# Patient Record
Sex: FEMALE | Race: WHITE | Employment: UNEMPLOYED | URBAN - METROPOLITAN AREA
[De-identification: names, ages, dates, MRNs, and addresses within clinical notes are randomized per-mention and may not be internally consistent; named-entity substitution may affect disease eponyms.]

---

## 2023-04-06 ENCOUNTER — APPOINTMENT (OUTPATIENT)
Dept: CT IMAGING | Facility: HOSPITAL | Age: 83
End: 2023-04-06
Attending: EMERGENCY MEDICINE
Payer: MEDICARE

## 2023-04-06 ENCOUNTER — APPOINTMENT (OUTPATIENT)
Dept: GENERAL RADIOLOGY | Facility: HOSPITAL | Age: 83
End: 2023-04-06
Attending: EMERGENCY MEDICINE
Payer: MEDICARE

## 2023-04-06 ENCOUNTER — HOSPITAL ENCOUNTER (INPATIENT)
Facility: HOSPITAL | Age: 83
LOS: 3 days | Discharge: NURSING, REHAB OR LONG-TERM CARE | End: 2023-04-10
Attending: EMERGENCY MEDICINE | Admitting: INTERNAL MEDICINE
Payer: MEDICARE

## 2023-04-06 DIAGNOSIS — S72.002A CLOSED FRACTURE OF NECK OF LEFT FEMUR, INITIAL ENCOUNTER (HCC): Primary | ICD-10-CM

## 2023-04-06 LAB
ALBUMIN SERPL-MCNC: 3.4 G/DL (ref 3.4–5)
ALBUMIN/GLOB SERPL: 1 {RATIO} (ref 1–2)
ALP LIVER SERPL-CCNC: 90 U/L
ALT SERPL-CCNC: 23 U/L
ANION GAP SERPL CALC-SCNC: 6 MMOL/L (ref 0–18)
AST SERPL-CCNC: 23 U/L (ref 15–37)
BASOPHILS # BLD AUTO: 0.05 X10(3) UL (ref 0–0.2)
BASOPHILS NFR BLD AUTO: 0.4 %
BILIRUB SERPL-MCNC: 0.6 MG/DL (ref 0.1–2)
BUN BLD-MCNC: 23 MG/DL (ref 7–18)
CALCIUM BLD-MCNC: 9.1 MG/DL (ref 8.5–10.1)
CHLORIDE SERPL-SCNC: 107 MMOL/L (ref 98–112)
CO2 SERPL-SCNC: 27 MMOL/L (ref 21–32)
CREAT BLD-MCNC: 0.97 MG/DL
EOSINOPHIL # BLD AUTO: 0.1 X10(3) UL (ref 0–0.7)
EOSINOPHIL NFR BLD AUTO: 0.8 %
ERYTHROCYTE [DISTWIDTH] IN BLOOD BY AUTOMATED COUNT: 13.2 %
GFR SERPLBLD BASED ON 1.73 SQ M-ARVRAT: 59 ML/MIN/1.73M2 (ref 60–?)
GLOBULIN PLAS-MCNC: 3.5 G/DL (ref 2.8–4.4)
GLUCOSE BLD-MCNC: 147 MG/DL (ref 70–99)
HCT VFR BLD AUTO: 38.3 %
HGB BLD-MCNC: 13.1 G/DL
IMM GRANULOCYTES # BLD AUTO: 0.06 X10(3) UL (ref 0–1)
IMM GRANULOCYTES NFR BLD: 0.5 %
LYMPHOCYTES # BLD AUTO: 1.21 X10(3) UL (ref 1–4)
LYMPHOCYTES NFR BLD AUTO: 9.7 %
MCH RBC QN AUTO: 30.5 PG (ref 26–34)
MCHC RBC AUTO-ENTMCNC: 34.2 G/DL (ref 31–37)
MCV RBC AUTO: 89.1 FL
MONOCYTES # BLD AUTO: 0.93 X10(3) UL (ref 0.1–1)
MONOCYTES NFR BLD AUTO: 7.4 %
NEUTROPHILS # BLD AUTO: 10.15 X10 (3) UL (ref 1.5–7.7)
NEUTROPHILS # BLD AUTO: 10.15 X10(3) UL (ref 1.5–7.7)
NEUTROPHILS NFR BLD AUTO: 81.2 %
OSMOLALITY SERPL CALC.SUM OF ELEC: 296 MOSM/KG (ref 275–295)
PLATELET # BLD AUTO: 177 10(3)UL (ref 150–450)
POTASSIUM SERPL-SCNC: 3.9 MMOL/L (ref 3.5–5.1)
PROT SERPL-MCNC: 6.9 G/DL (ref 6.4–8.2)
RBC # BLD AUTO: 4.3 X10(6)UL
SARS-COV-2 RNA RESP QL NAA+PROBE: NOT DETECTED
SODIUM SERPL-SCNC: 140 MMOL/L (ref 136–145)
WBC # BLD AUTO: 12.5 X10(3) UL (ref 4–11)

## 2023-04-06 PROCEDURE — 73560 X-RAY EXAM OF KNEE 1 OR 2: CPT | Performed by: EMERGENCY MEDICINE

## 2023-04-06 PROCEDURE — 73502 X-RAY EXAM HIP UNI 2-3 VIEWS: CPT | Performed by: EMERGENCY MEDICINE

## 2023-04-06 PROCEDURE — 99223 1ST HOSP IP/OBS HIGH 75: CPT | Performed by: INTERNAL MEDICINE

## 2023-04-06 PROCEDURE — 73700 CT LOWER EXTREMITY W/O DYE: CPT | Performed by: EMERGENCY MEDICINE

## 2023-04-06 PROCEDURE — 76377 3D RENDER W/INTRP POSTPROCES: CPT | Performed by: EMERGENCY MEDICINE

## 2023-04-06 RX ORDER — ACETAMINOPHEN 500 MG
1000 TABLET ORAL ONCE
Status: COMPLETED | OUTPATIENT
Start: 2023-04-06 | End: 2023-04-06

## 2023-04-06 NOTE — ED INITIAL ASSESSMENT (HPI)
A&Ox3 patient bib family for c/o L sided hip and leg pain s/p fall today    Patient was trying to sit on the couch and missed the seat, falling and landing on L side    Denies hitting head/LOC, no AC therapy     RR even/NL, CMS intact

## 2023-04-07 ENCOUNTER — ANESTHESIA EVENT (OUTPATIENT)
Dept: SURGERY | Facility: HOSPITAL | Age: 83
End: 2023-04-07
Payer: MEDICARE

## 2023-04-07 ENCOUNTER — APPOINTMENT (OUTPATIENT)
Dept: GENERAL RADIOLOGY | Facility: HOSPITAL | Age: 83
End: 2023-04-07
Attending: ORTHOPAEDIC SURGERY
Payer: MEDICARE

## 2023-04-07 ENCOUNTER — ANESTHESIA (OUTPATIENT)
Dept: SURGERY | Facility: HOSPITAL | Age: 83
End: 2023-04-07
Payer: MEDICARE

## 2023-04-07 LAB
ANION GAP SERPL CALC-SCNC: 6 MMOL/L (ref 0–18)
ATRIAL RATE: 80 BPM
BASOPHILS # BLD AUTO: 0.04 X10(3) UL (ref 0–0.2)
BASOPHILS NFR BLD AUTO: 0.5 %
BILIRUB UR QL STRIP.AUTO: NEGATIVE
BUN BLD-MCNC: 20 MG/DL (ref 7–18)
CALCIUM BLD-MCNC: 9 MG/DL (ref 8.5–10.1)
CHLORIDE SERPL-SCNC: 109 MMOL/L (ref 98–112)
CLARITY UR REFRACT.AUTO: CLEAR
CO2 SERPL-SCNC: 26 MMOL/L (ref 21–32)
COLOR UR AUTO: YELLOW
CREAT BLD-MCNC: 0.9 MG/DL
EOSINOPHIL # BLD AUTO: 0.32 X10(3) UL (ref 0–0.7)
EOSINOPHIL NFR BLD AUTO: 4.2 %
ERYTHROCYTE [DISTWIDTH] IN BLOOD BY AUTOMATED COUNT: 13.3 %
EST. AVERAGE GLUCOSE BLD GHB EST-MCNC: 157 MG/DL (ref 68–126)
GFR SERPLBLD BASED ON 1.73 SQ M-ARVRAT: 64 ML/MIN/1.73M2 (ref 60–?)
GLUCOSE BLD-MCNC: 109 MG/DL (ref 70–99)
GLUCOSE BLD-MCNC: 115 MG/DL (ref 70–99)
GLUCOSE BLD-MCNC: 122 MG/DL (ref 70–99)
GLUCOSE BLD-MCNC: 123 MG/DL (ref 70–99)
GLUCOSE BLD-MCNC: 125 MG/DL (ref 70–99)
GLUCOSE UR STRIP.AUTO-MCNC: NEGATIVE MG/DL
HBA1C MFR BLD: 7.1 % (ref ?–5.7)
HCT VFR BLD AUTO: 39.7 %
HGB BLD-MCNC: 13 G/DL
IMM GRANULOCYTES # BLD AUTO: 0.02 X10(3) UL (ref 0–1)
IMM GRANULOCYTES NFR BLD: 0.3 %
INR BLD: 1.14 (ref 0.85–1.16)
KETONES UR STRIP.AUTO-MCNC: NEGATIVE MG/DL
LEUKOCYTE ESTERASE UR QL STRIP.AUTO: NEGATIVE
LYMPHOCYTES # BLD AUTO: 1.37 X10(3) UL (ref 1–4)
LYMPHOCYTES NFR BLD AUTO: 17.8 %
MCH RBC QN AUTO: 30 PG (ref 26–34)
MCHC RBC AUTO-ENTMCNC: 32.7 G/DL (ref 31–37)
MCV RBC AUTO: 91.5 FL
MONOCYTES # BLD AUTO: 0.68 X10(3) UL (ref 0.1–1)
MONOCYTES NFR BLD AUTO: 8.8 %
MRSA NASAL: NEGATIVE
NEUTROPHILS # BLD AUTO: 5.26 X10 (3) UL (ref 1.5–7.7)
NEUTROPHILS # BLD AUTO: 5.26 X10(3) UL (ref 1.5–7.7)
NEUTROPHILS NFR BLD AUTO: 68.4 %
NITRITE UR QL STRIP.AUTO: NEGATIVE
OSMOLALITY SERPL CALC.SUM OF ELEC: 296 MOSM/KG (ref 275–295)
P AXIS: 58 DEGREES
P-R INTERVAL: 164 MS
PH UR STRIP.AUTO: 5 [PH] (ref 5–8)
PLATELET # BLD AUTO: 181 10(3)UL (ref 150–450)
POTASSIUM SERPL-SCNC: 3.7 MMOL/L (ref 3.5–5.1)
PROT UR STRIP.AUTO-MCNC: NEGATIVE MG/DL
PROTHROMBIN TIME: 14.6 SECONDS (ref 11.6–14.8)
Q-T INTERVAL: 410 MS
QRS DURATION: 82 MS
QTC CALCULATION (BEZET): 472 MS
R AXIS: -8 DEGREES
RBC # BLD AUTO: 4.34 X10(6)UL
RBC UR QL AUTO: NEGATIVE
SODIUM SERPL-SCNC: 141 MMOL/L (ref 136–145)
SP GR UR STRIP.AUTO: 1.02 (ref 1–1.03)
STAPH A BY PCR: NEGATIVE
T AXIS: 111 DEGREES
UROBILINOGEN UR STRIP.AUTO-MCNC: <2 MG/DL
VENTRICULAR RATE: 80 BPM
WBC # BLD AUTO: 7.7 X10(3) UL (ref 4–11)

## 2023-04-07 PROCEDURE — 0QS734Z REPOSITION LEFT UPPER FEMUR WITH INTERNAL FIXATION DEVICE, PERCUTANEOUS APPROACH: ICD-10-PCS | Performed by: ORTHOPAEDIC SURGERY

## 2023-04-07 PROCEDURE — 76000 FLUOROSCOPY <1 HR PHYS/QHP: CPT | Performed by: ORTHOPAEDIC SURGERY

## 2023-04-07 PROCEDURE — 99232 SBSQ HOSP IP/OBS MODERATE 35: CPT | Performed by: HOSPITALIST

## 2023-04-07 PROCEDURE — BQ111ZZ FLUOROSCOPY OF LEFT HIP USING LOW OSMOLAR CONTRAST: ICD-10-PCS | Performed by: ORTHOPAEDIC SURGERY

## 2023-04-07 DEVICE — SCREW CANN PT 6.5 16X80: Type: IMPLANTABLE DEVICE | Site: HIP | Status: FUNCTIONAL

## 2023-04-07 DEVICE — SCREW CANN PT 7.3X80 209.880: Type: IMPLANTABLE DEVICE | Site: HIP | Status: FUNCTIONAL

## 2023-04-07 RX ORDER — SODIUM CHLORIDE 9 MG/ML
INJECTION, SOLUTION INTRAVENOUS CONTINUOUS
Status: DISCONTINUED | OUTPATIENT
Start: 2023-04-07 | End: 2023-04-08

## 2023-04-07 RX ORDER — VANCOMYCIN HYDROCHLORIDE
15 ONCE
Status: COMPLETED | OUTPATIENT
Start: 2023-04-07 | End: 2023-04-08

## 2023-04-07 RX ORDER — DIPHENHYDRAMINE HCL 25 MG
25 CAPSULE ORAL EVERY 4 HOURS PRN
Status: DISCONTINUED | OUTPATIENT
Start: 2023-04-07 | End: 2023-04-10

## 2023-04-07 RX ORDER — ESCITALOPRAM OXALATE 10 MG/1
10 TABLET ORAL DAILY
Status: DISCONTINUED | OUTPATIENT
Start: 2023-04-07 | End: 2023-04-10

## 2023-04-07 RX ORDER — SODIUM PHOSPHATE, DIBASIC AND SODIUM PHOSPHATE, MONOBASIC 7; 19 G/133ML; G/133ML
1 ENEMA RECTAL ONCE AS NEEDED
Status: DISCONTINUED | OUTPATIENT
Start: 2023-04-07 | End: 2023-04-10

## 2023-04-07 RX ORDER — METOCLOPRAMIDE HYDROCHLORIDE 5 MG/ML
10 INJECTION INTRAMUSCULAR; INTRAVENOUS EVERY 8 HOURS PRN
Status: DISCONTINUED | OUTPATIENT
Start: 2023-04-07 | End: 2023-04-07 | Stop reason: HOSPADM

## 2023-04-07 RX ORDER — LOPERAMIDE HYDROCHLORIDE 2 MG/1
2 CAPSULE ORAL
COMMUNITY

## 2023-04-07 RX ORDER — MORPHINE SULFATE 4 MG/ML
4 INJECTION, SOLUTION INTRAMUSCULAR; INTRAVENOUS EVERY 2 HOUR PRN
Status: DISCONTINUED | OUTPATIENT
Start: 2023-04-07 | End: 2023-04-10

## 2023-04-07 RX ORDER — ACETAMINOPHEN 10 MG/ML
1000 INJECTION, SOLUTION INTRAVENOUS ONCE AS NEEDED
Status: CANCELLED | OUTPATIENT
Start: 2023-04-07 | End: 2023-04-07

## 2023-04-07 RX ORDER — AMLODIPINE BESYLATE 2.5 MG/1
2.5 TABLET ORAL DAILY
Status: DISCONTINUED | OUTPATIENT
Start: 2023-04-07 | End: 2023-04-08

## 2023-04-07 RX ORDER — PANTOPRAZOLE SODIUM 20 MG/1
20 TABLET, DELAYED RELEASE ORAL DAILY
Status: DISCONTINUED | OUTPATIENT
Start: 2023-04-07 | End: 2023-04-10

## 2023-04-07 RX ORDER — LABETALOL HYDROCHLORIDE 5 MG/ML
5 INJECTION, SOLUTION INTRAVENOUS EVERY 5 MIN PRN
Status: DISCONTINUED | OUTPATIENT
Start: 2023-04-07 | End: 2023-04-07 | Stop reason: HOSPADM

## 2023-04-07 RX ORDER — CEFAZOLIN SODIUM/WATER 2 G/20 ML
SYRINGE (ML) INTRAVENOUS AS NEEDED
Status: DISCONTINUED | OUTPATIENT
Start: 2023-04-07 | End: 2023-04-07 | Stop reason: SURG

## 2023-04-07 RX ORDER — BISACODYL 10 MG
10 SUPPOSITORY, RECTAL RECTAL
Status: DISCONTINUED | OUTPATIENT
Start: 2023-04-07 | End: 2023-04-10

## 2023-04-07 RX ORDER — SODIUM CHLORIDE, SODIUM LACTATE, POTASSIUM CHLORIDE, CALCIUM CHLORIDE 600; 310; 30; 20 MG/100ML; MG/100ML; MG/100ML; MG/100ML
INJECTION, SOLUTION INTRAVENOUS CONTINUOUS PRN
Status: DISCONTINUED | OUTPATIENT
Start: 2023-04-07 | End: 2023-04-07 | Stop reason: SURG

## 2023-04-07 RX ORDER — METOCLOPRAMIDE HYDROCHLORIDE 5 MG/ML
10 INJECTION INTRAMUSCULAR; INTRAVENOUS EVERY 8 HOURS PRN
Status: DISCONTINUED | OUTPATIENT
Start: 2023-04-07 | End: 2023-04-08

## 2023-04-07 RX ORDER — GLIMEPIRIDE 2 MG/1
1 TABLET ORAL 2 TIMES DAILY
COMMUNITY
Start: 2023-01-16

## 2023-04-07 RX ORDER — B-COMPLEX WITH VITAMIN C
1 TABLET ORAL DAILY
COMMUNITY

## 2023-04-07 RX ORDER — POLYETHYLENE GLYCOL 3350 17 G/17G
17 POWDER, FOR SOLUTION ORAL DAILY PRN
Status: DISCONTINUED | OUTPATIENT
Start: 2023-04-07 | End: 2023-04-10

## 2023-04-07 RX ORDER — MELATONIN
325
Status: DISCONTINUED | OUTPATIENT
Start: 2023-04-08 | End: 2023-04-10

## 2023-04-07 RX ORDER — RAMIPRIL 10 MG/1
10 CAPSULE ORAL DAILY
Qty: 30 CAPSULE | Refills: 12 | COMMUNITY
Start: 2023-01-16 | End: 2023-04-10

## 2023-04-07 RX ORDER — DEXAMETHASONE SODIUM PHOSPHATE 4 MG/ML
VIAL (ML) INJECTION AS NEEDED
Status: DISCONTINUED | OUTPATIENT
Start: 2023-04-07 | End: 2023-04-07 | Stop reason: SURG

## 2023-04-07 RX ORDER — HYDROCODONE BITARTRATE AND ACETAMINOPHEN 5; 325 MG/1; MG/1
2 TABLET ORAL ONCE AS NEEDED
Status: DISCONTINUED | OUTPATIENT
Start: 2023-04-07 | End: 2023-04-07 | Stop reason: HOSPADM

## 2023-04-07 RX ORDER — NICOTINE POLACRILEX 4 MG
30 LOZENGE BUCCAL
Status: DISCONTINUED | OUTPATIENT
Start: 2023-04-07 | End: 2023-04-10

## 2023-04-07 RX ORDER — DEXTROSE MONOHYDRATE 25 G/50ML
50 INJECTION, SOLUTION INTRAVENOUS
Status: DISCONTINUED | OUTPATIENT
Start: 2023-04-07 | End: 2023-04-10

## 2023-04-07 RX ORDER — NALOXONE HYDROCHLORIDE 0.4 MG/ML
80 INJECTION, SOLUTION INTRAMUSCULAR; INTRAVENOUS; SUBCUTANEOUS AS NEEDED
Status: DISCONTINUED | OUTPATIENT
Start: 2023-04-07 | End: 2023-04-07 | Stop reason: HOSPADM

## 2023-04-07 RX ORDER — ONDANSETRON 2 MG/ML
4 INJECTION INTRAMUSCULAR; INTRAVENOUS EVERY 6 HOURS PRN
Status: DISCONTINUED | OUTPATIENT
Start: 2023-04-07 | End: 2023-04-10

## 2023-04-07 RX ORDER — SENNOSIDES 8.6 MG
17.2 TABLET ORAL NIGHTLY
Status: DISCONTINUED | OUTPATIENT
Start: 2023-04-07 | End: 2023-04-10

## 2023-04-07 RX ORDER — DIPHENHYDRAMINE HYDROCHLORIDE 50 MG/ML
25 INJECTION INTRAMUSCULAR; INTRAVENOUS ONCE AS NEEDED
Status: ACTIVE | OUTPATIENT
Start: 2023-04-07 | End: 2023-04-07

## 2023-04-07 RX ORDER — ACETAMINOPHEN 500 MG
1000 TABLET ORAL ONCE AS NEEDED
Status: DISCONTINUED | OUTPATIENT
Start: 2023-04-07 | End: 2023-04-07 | Stop reason: HOSPADM

## 2023-04-07 RX ORDER — ACETAMINOPHEN 500 MG
500 TABLET ORAL EVERY 4 HOURS PRN
Status: DISCONTINUED | OUTPATIENT
Start: 2023-04-07 | End: 2023-04-10

## 2023-04-07 RX ORDER — ONDANSETRON 2 MG/ML
4 INJECTION INTRAMUSCULAR; INTRAVENOUS EVERY 6 HOURS PRN
Status: DISCONTINUED | OUTPATIENT
Start: 2023-04-07 | End: 2023-04-07 | Stop reason: HOSPADM

## 2023-04-07 RX ORDER — MONTELUKAST SODIUM 4 MG/1
1 TABLET, CHEWABLE ORAL
COMMUNITY

## 2023-04-07 RX ORDER — HEPARIN SODIUM 5000 [USP'U]/ML
5000 INJECTION, SOLUTION INTRAVENOUS; SUBCUTANEOUS EVERY 8 HOURS SCHEDULED
Status: DISCONTINUED | OUTPATIENT
Start: 2023-04-07 | End: 2023-04-07

## 2023-04-07 RX ORDER — LIDOCAINE HYDROCHLORIDE 10 MG/ML
INJECTION, SOLUTION EPIDURAL; INFILTRATION; INTRACAUDAL; PERINEURAL AS NEEDED
Status: DISCONTINUED | OUTPATIENT
Start: 2023-04-07 | End: 2023-04-07 | Stop reason: SURG

## 2023-04-07 RX ORDER — PANTOPRAZOLE SODIUM 20 MG/1
20 TABLET, DELAYED RELEASE ORAL DAILY
Qty: 30 TABLET | Refills: 12 | COMMUNITY
Start: 2023-01-16 | End: 2024-01-21

## 2023-04-07 RX ORDER — PHENYLEPHRINE HCL 10 MG/ML
VIAL (ML) INJECTION AS NEEDED
Status: DISCONTINUED | OUTPATIENT
Start: 2023-04-07 | End: 2023-04-07 | Stop reason: SURG

## 2023-04-07 RX ORDER — DOCUSATE SODIUM 100 MG/1
100 CAPSULE, LIQUID FILLED ORAL 2 TIMES DAILY
Status: DISCONTINUED | OUTPATIENT
Start: 2023-04-07 | End: 2023-04-10

## 2023-04-07 RX ORDER — SODIUM CHLORIDE, SODIUM LACTATE, POTASSIUM CHLORIDE, CALCIUM CHLORIDE 600; 310; 30; 20 MG/100ML; MG/100ML; MG/100ML; MG/100ML
INJECTION, SOLUTION INTRAVENOUS CONTINUOUS
Status: DISCONTINUED | OUTPATIENT
Start: 2023-04-07 | End: 2023-04-07 | Stop reason: HOSPADM

## 2023-04-07 RX ORDER — ESCITALOPRAM OXALATE 10 MG/1
10 TABLET ORAL DAILY
COMMUNITY
Start: 2023-01-16

## 2023-04-07 RX ORDER — AMLODIPINE BESYLATE 10 MG/1
10 TABLET ORAL DAILY
Qty: 30 TABLET | Refills: 12 | COMMUNITY
Start: 2023-01-16 | End: 2024-01-21

## 2023-04-07 RX ORDER — ONDANSETRON 2 MG/ML
INJECTION INTRAMUSCULAR; INTRAVENOUS AS NEEDED
Status: DISCONTINUED | OUTPATIENT
Start: 2023-04-07 | End: 2023-04-07 | Stop reason: SURG

## 2023-04-07 RX ORDER — NICOTINE POLACRILEX 4 MG
15 LOZENGE BUCCAL
Status: DISCONTINUED | OUTPATIENT
Start: 2023-04-07 | End: 2023-04-10

## 2023-04-07 RX ORDER — HYDROMORPHONE HYDROCHLORIDE 1 MG/ML
0.2 INJECTION, SOLUTION INTRAMUSCULAR; INTRAVENOUS; SUBCUTANEOUS EVERY 5 MIN PRN
Status: DISCONTINUED | OUTPATIENT
Start: 2023-04-07 | End: 2023-04-07 | Stop reason: HOSPADM

## 2023-04-07 RX ORDER — MORPHINE SULFATE 2 MG/ML
2 INJECTION, SOLUTION INTRAMUSCULAR; INTRAVENOUS EVERY 2 HOUR PRN
Status: DISCONTINUED | OUTPATIENT
Start: 2023-04-07 | End: 2023-04-10

## 2023-04-07 RX ORDER — HYDROMORPHONE HYDROCHLORIDE 1 MG/ML
0.3 INJECTION, SOLUTION INTRAMUSCULAR; INTRAVENOUS; SUBCUTANEOUS EVERY 5 MIN PRN
Status: DISCONTINUED | OUTPATIENT
Start: 2023-04-07 | End: 2023-04-07 | Stop reason: HOSPADM

## 2023-04-07 RX ORDER — HYDROMORPHONE HYDROCHLORIDE 1 MG/ML
0.4 INJECTION, SOLUTION INTRAMUSCULAR; INTRAVENOUS; SUBCUTANEOUS EVERY 5 MIN PRN
Status: DISCONTINUED | OUTPATIENT
Start: 2023-04-07 | End: 2023-04-07 | Stop reason: HOSPADM

## 2023-04-07 RX ORDER — HYDROCODONE BITARTRATE AND ACETAMINOPHEN 5; 325 MG/1; MG/1
1 TABLET ORAL ONCE AS NEEDED
Status: DISCONTINUED | OUTPATIENT
Start: 2023-04-07 | End: 2023-04-07 | Stop reason: HOSPADM

## 2023-04-07 RX ORDER — MORPHINE SULFATE 2 MG/ML
1 INJECTION, SOLUTION INTRAMUSCULAR; INTRAVENOUS EVERY 2 HOUR PRN
Status: DISCONTINUED | OUTPATIENT
Start: 2023-04-07 | End: 2023-04-10

## 2023-04-07 RX ORDER — DIPHENHYDRAMINE HYDROCHLORIDE 50 MG/ML
12.5 INJECTION INTRAMUSCULAR; INTRAVENOUS EVERY 4 HOURS PRN
Status: DISCONTINUED | OUTPATIENT
Start: 2023-04-07 | End: 2023-04-10

## 2023-04-07 RX ADMIN — PHENYLEPHRINE HCL 100 MCG: 10 MG/ML VIAL (ML) INJECTION at 19:55:00

## 2023-04-07 RX ADMIN — DEXAMETHASONE SODIUM PHOSPHATE 4 MG: 4 MG/ML VIAL (ML) INJECTION at 19:53:00

## 2023-04-07 RX ADMIN — PHENYLEPHRINE HCL 100 MCG: 10 MG/ML VIAL (ML) INJECTION at 20:01:00

## 2023-04-07 RX ADMIN — SODIUM CHLORIDE, SODIUM LACTATE, POTASSIUM CHLORIDE, CALCIUM CHLORIDE: 600; 310; 30; 20 INJECTION, SOLUTION INTRAVENOUS at 19:38:00

## 2023-04-07 RX ADMIN — CEFAZOLIN SODIUM/WATER 2 G: 2 G/20 ML SYRINGE (ML) INTRAVENOUS at 19:58:00

## 2023-04-07 RX ADMIN — SODIUM CHLORIDE, SODIUM LACTATE, POTASSIUM CHLORIDE, CALCIUM CHLORIDE: 600; 310; 30; 20 INJECTION, SOLUTION INTRAVENOUS at 20:58:00

## 2023-04-07 RX ADMIN — ONDANSETRON 4 MG: 2 INJECTION INTRAMUSCULAR; INTRAVENOUS at 20:59:00

## 2023-04-07 RX ADMIN — LIDOCAINE HYDROCHLORIDE 50 MG: 10 INJECTION, SOLUTION EPIDURAL; INFILTRATION; INTRACAUDAL; PERINEURAL at 19:45:00

## 2023-04-07 NOTE — PLAN OF CARE
Patient AOx4,VSS, on RA, telemetry monitoring. Denies pain at the moment. SCDs in place. NPO for surgery today at 1700. Voids freely via [address].  Bed rest.

## 2023-04-07 NOTE — ED QUICK NOTES
Orders for admission, patient is aware of plan and ready to go upstairs. Any questions, please call ED RN Rochelle Thomas at extension [phone].      Patient Covid vaccination status: Fully vaccinated     COVID Test Ordered in ED: Rapid SARS-CoV-2 by PCR    COVID Suspicion at Admission: Low clinical suspicion for COVID    Running Infusions:  None    Mental Status/LOC at time of transport: AAOx4    Other pertinent information:   CIWA score: N/A   NIH score:  N/A

## 2023-04-07 NOTE — PLAN OF CARE
Xray and CT reviewed. Impacted left femoral neck fracture. NPO after MN. Plan on percutaneous screw fixation tomorrow afternoon/evening when OR available. I will see her prior to surgery.

## 2023-04-07 NOTE — ED QUICK NOTES
Patient given food and water, ED MD said it was okay for patient to eat. Patient made aware that she cannot have anything after midnight.

## 2023-04-07 NOTE — PLAN OF CARE
NURSING ADMISSION NOTE  Patient admitted via bed  Oriented to room. Safety precautions initiated. Bed in low position. Call light in reach. Patient A & o x4. VSS, on RA. C/o pain with movement. Denies any numbness/tingling to BLE. Purewick in place. DTV by 0700. NPO. Safety measures in place. Instructed to use call light.

## 2023-04-08 LAB
ANION GAP SERPL CALC-SCNC: 6 MMOL/L (ref 0–18)
BASOPHILS # BLD AUTO: 0.02 X10(3) UL (ref 0–0.2)
BASOPHILS NFR BLD AUTO: 0.2 %
BUN BLD-MCNC: 22 MG/DL (ref 7–18)
CALCIUM BLD-MCNC: 8.2 MG/DL (ref 8.5–10.1)
CHLORIDE SERPL-SCNC: 108 MMOL/L (ref 98–112)
CO2 SERPL-SCNC: 25 MMOL/L (ref 21–32)
CREAT BLD-MCNC: 1.06 MG/DL
EOSINOPHIL # BLD AUTO: 0 X10(3) UL (ref 0–0.7)
EOSINOPHIL NFR BLD AUTO: 0 %
ERYTHROCYTE [DISTWIDTH] IN BLOOD BY AUTOMATED COUNT: 13.4 %
GFR SERPLBLD BASED ON 1.73 SQ M-ARVRAT: 53 ML/MIN/1.73M2 (ref 60–?)
GLUCOSE BLD-MCNC: 164 MG/DL (ref 70–99)
GLUCOSE BLD-MCNC: 173 MG/DL (ref 70–99)
GLUCOSE BLD-MCNC: 236 MG/DL (ref 70–99)
GLUCOSE BLD-MCNC: 265 MG/DL (ref 70–99)
GLUCOSE BLD-MCNC: 293 MG/DL (ref 70–99)
HCT VFR BLD AUTO: 39.2 %
HGB BLD-MCNC: 12.7 G/DL
IMM GRANULOCYTES # BLD AUTO: 0.02 X10(3) UL (ref 0–1)
IMM GRANULOCYTES NFR BLD: 0.2 %
LYMPHOCYTES # BLD AUTO: 0.44 X10(3) UL (ref 1–4)
LYMPHOCYTES NFR BLD AUTO: 5.2 %
MCH RBC QN AUTO: 30.3 PG (ref 26–34)
MCHC RBC AUTO-ENTMCNC: 32.4 G/DL (ref 31–37)
MCV RBC AUTO: 93.6 FL
MONOCYTES # BLD AUTO: 0.3 X10(3) UL (ref 0.1–1)
MONOCYTES NFR BLD AUTO: 3.6 %
NEUTROPHILS # BLD AUTO: 7.61 X10 (3) UL (ref 1.5–7.7)
NEUTROPHILS # BLD AUTO: 7.61 X10(3) UL (ref 1.5–7.7)
NEUTROPHILS NFR BLD AUTO: 90.8 %
OSMOLALITY SERPL CALC.SUM OF ELEC: 302 MOSM/KG (ref 275–295)
PLATELET # BLD AUTO: 178 10(3)UL (ref 150–450)
POTASSIUM SERPL-SCNC: 4.7 MMOL/L (ref 3.5–5.1)
RBC # BLD AUTO: 4.19 X10(6)UL
SODIUM SERPL-SCNC: 139 MMOL/L (ref 136–145)
WBC # BLD AUTO: 8.4 X10(3) UL (ref 4–11)

## 2023-04-08 PROCEDURE — 99231 SBSQ HOSP IP/OBS SF/LOW 25: CPT | Performed by: HOSPITALIST

## 2023-04-08 RX ORDER — AMLODIPINE BESYLATE 5 MG/1
5 TABLET ORAL DAILY
Status: DISCONTINUED | OUTPATIENT
Start: 2023-04-08 | End: 2023-04-09

## 2023-04-08 RX ORDER — METOCLOPRAMIDE HYDROCHLORIDE 5 MG/ML
5 INJECTION INTRAMUSCULAR; INTRAVENOUS EVERY 8 HOURS PRN
Status: DISCONTINUED | OUTPATIENT
Start: 2023-04-08 | End: 2023-04-10

## 2023-04-08 NOTE — OPERATIVE REPORT
[facility]    PATIENT'S NAME: Kassie GARCIA   ATTENDING PHYSICIAN: Meño Dia M.D. OPERATING PHYSICIAN: Monik Pérez M.D. PATIENT ACCOUNT#:   [de-identified]    LOCATION:  [address] [place] [address]  MEDICAL RECORD #:   [id number]       YOB: 1940  ADMISSION DATE:       04/06/2023      OPERATION DATE:  04/07/2023    OPERATIVE REPORT      PREOPERATIVE DIAGNOSIS:  Impacted left femoral neck fracture. POSTOPERATIVE DIAGNOSIS:  Impacted left femoral neck fracture. PROCEDURE:  Percutaneous screw fixation, left impacted femoral neck fracture. ASSISTANT:  None. ANESTHESIA:  General.    DRAIN:  None. SPECIMEN:  None. BLOOD LOSS:  60 mL. COMPLICATIONS:  None. INDICATIONS:  The patient is an 81-year-old female who is from [place]. She is visiting with her daughter. She went to sit on the couch and evidently slid off the couch and fell into her left side. She was seen in the ER and diagnosis was made of an impacted left femoral neck fracture with both CT scan and x-ray. I discussed with her that I would recommend percutaneous screw fixation to stabilize the fracture, so it does not move or shift. We discussed the technical aspects of the surgery. We discussed the risks which include, but are not limited to, infection, nerve damage, vessel damage, wound healing issues, malunion, nonunion, loss of fixation potentially leading to revision surgery to a total hip arthroplasty, DVT, pulmonary embolus, medical and/or anesthetic complications, among others. We discussed the anticipated postoperative recovery. After having understanding of the procedure, the risks, the alternatives, and the recovery, she did wish to proceed and an informed consent was obtained. OPERATIVE TECHNIQUE:  The patient was identified in the preoperative holding area and the correct hip, which was left hip, was identified and marked. She received preoperative prophylactic antibiotics.   She was taken to the operative suite and placed supine on the operative table. General anesthetic was provided. She was then placed on the fracture table. The left foot and ankle were placed in a well-padded fracture boot and placed in the fracture distractor. The right lower extremity was placed in a well-padded well leg zarco. C-arm was used to confirm excellent alignment in both the AP and lateral planes. This is certainly impacted. The left hip and lower extremity were then prepped and draped in usual sterile fashion. Time-out was performed confirming that the patient was the correct patient, left hip was the correct hip, and percutaneous screw fixation was the correct procedure. I made an incision over the lateral aspect of the hip. Using C-arm, I placed my inferior guidewire above the level of the lesser trochanter going through the inferior central aspect of the neck and into the femoral head. Good position was confirmed on the AP and lateral planes. The [place] 77 gun guide was used to place the posterior superior screw. The guidewire was placed posterior and proximal to the first screw. This was placed deeply in the femoral head. This was confirmed to be in excellent position on the AP and lateral planes. Using the same [place] 77 gun, I placed the anterior superior screw parallel to the more proximal screw and the inferior screw but in the anterior aspect of the head. This was placed. AP and lateral plane fluoroscopy confirmed excellent positioning of all guidewires. I measured just under an 80 for the inferior screw. I drilled for the 6.5 mm Synthes cannulated screws with a short thread. This was then placed. I thought it was a little bit short, but we left that in place. I then drilled for the proximal 2 screws. An 80 mm x 6.5 mm short thread Synthes cannulated screws were placed in the proximal 2 positions. This had excellent compression of the fracture.   I felt the most inferior screw was a little shorter than I wanted, so I changed this out to an 80 mm screw. I converted to a 7.3 mm size to get better fixation and compression. All had excellent seating purchase. AP and lateral plane fluoroscopy confirmed excellent fracture reduction and excellent hardware position in the AP and lateral planes. In both views, I took the hip through internal and external range of motion just to make sure there was no penetration of the subchondral bone and there was not. The wound was copiously irrigated. Deep layers were closed with 0 Vicryl. Subcutaneous layers were closed with 2-0 Vicryl and skin was closed with skin staples. Leg was cleaned and dried and Aquacel dressing was applied. All counts were correct according to the nursing staff. The patient tolerated procedure well with no intraoperative complications. DISPOSITION:  The patient was awakened from her anesthetic and taken to the recovery room in stable condition. She had good pulses postoperatively. She will be transferred back to her bed. I will make her about 50% weightbearing since she did seem to have good bone. She will be placed on DVT prophylaxis. She will have 24 hours IV antibiotics. I discussed the case with the family. Again, there were no intraoperative complications.     Dictated By Tea Orellana M.D.  d: 04/07/2023 21:18:19  t: 04/08/2023 06:10:45  Job [id number]  PI/

## 2023-04-08 NOTE — PLAN OF CARE
Patient A&Ox4, VSS on room air. POD #1, dressing to L hip C/D/I, ice applied. Pain is controlled. Up in chair with assistance, doing well. Tolerating diet, voiding freely, LBM 4/06. TELE, SCD, . Plan to DC to [facility]. Plan of care reviewed with patient, all questions answered, verbalized understanding.

## 2023-04-08 NOTE — ANESTHESIA POSTPROCEDURE EVALUATION
[address] Patient Status:  Inpatient   Age/Gender 81year old female MRN [id number]   Location [address] [facility] Attending Divine Azar MD   Hosp Day # 0 PCP No primary care provider on file. Anesthesia Post-op Note    LEFT HIP CANNULATED SCREW FEMORAL NECK    Procedure Summary     Date: 04/07/23 Room / Location: [address] [place] [address] OR 10 / [facility] OR    Anesthesia Start: 1938 Anesthesia Stop:     Procedure: LEFT HIP CANNULATED SCREW FEMORAL NECK (Left: Hip) Diagnosis:       Fracture      (Fracture [T14. 8XXA])    Surgeons: Virginia Harrington MD Anesthesiologist: Rox Madrid MD    Anesthesia Type: general ASA Status: 3          Anesthesia Type: general    Vitals Value Taken Time   /73 04/07/23 2126   Temp 97.4 04/07/23 2129   Pulse 80 04/07/23 2129   Resp 22 04/07/23 2129   SpO2 100 % 04/07/23 2129   Vitals shown include unvalidated device data. Patient Location: PACU    Anesthesia Type: general    Airway Patency: patent and extubated    Postop Pain Control: adequate    Mental Status: mildly sedated but able to meaningfully participate in the post-anesthesia evaluation    Nausea/Vomiting: none    Cardiopulmonary/Hydration status: stable euvolemic    Complications: no apparent anesthesia related complications    Postop vital signs: stable    Dental Exam: Unchanged from Preop    Patient to be discharged from PACU when criteria met.

## 2023-04-08 NOTE — ANESTHESIA PROCEDURE NOTES
Airway  Date/Time: 4/7/2023 7:46 PM  Urgency: elective      General Information and Staff    Patient location during procedure: OR  Anesthesiologist: Lynette Jolley MD  Performed: anesthesiologist   Performed by: Lynette Jolley MD  Authorized by: Lynette Jolley MD      Indications and Patient Condition  Indications for airway management: anesthesia  Sedation level: deep  Preoxygenated: yes  Patient position: sniffing  Mask difficulty assessment: 0 - not attempted    Final Airway Details  Final airway type: supraglottic airway      Successful airway: classic  Size 3       Number of attempts at approach: 1

## 2023-04-08 NOTE — CONSULTS
[facility]    PATIENT'S NAME: Ashley GARCIA   ATTENDING PHYSICIAN: MAURICE Palma: Shameka Traylor M.D. PATIENT ACCOUNT#:   [de-identified]    LOCATION:  OR  OR [place] ROOMS 2 EDWP 10  MEDICAL RECORD #:   [id number]       YOB: 1940  ADMISSION DATE:       04/06/2023      CONSULT DATE:  04/07/2023    REPORT OF CONSULTATION    REASON FOR CONSULTATION:  I was consulted regarding a left impacted femoral neck fracture. HISTORY OF PRESENT ILLNESS:  The patient is an 44-year-old female who is from [place], but she was here visiting her daughter. Last night, she went to sit on the couch, but evidently did not sit far enough back and slid off and fell onto her left side. She had hip pain and was taken to the [facility] ER. Radiographs revealed an impacted left femoral neck fracture both by x-ray and CT scan. No fracture noted in the left femur, but she does have a total knee arthroplasty. Currently, she does not really have pain unless she tries to move the hip. She has no other complaints. PAST MEDICAL HISTORY:  Significant for hypertension and diabetes mellitus. She is on no blood thinner. MEDICATIONS:  Reviewed in Epic. ALLERGIES:  Penicillins and statins. PHYSICAL EXAMINATION:    GENERAL:  She is lying comfortably in her hospital bed. VITAL SIGNS:  She is afebrile. Vital signs are stable. EXTREMITIES:  There is no shortening of the left lower extremity. She is able to dorsiflex and plantarflex the ankle. She has good sensation and a palpable dorsalis pedis pulse with good capillary refill. LABORATORY DATA:  Hemoglobin is 13; platelet count is 655,007. I reviewed plain radiographs which appear to show an impacted femoral neck fracture with no displacement. Lesser trochanters are at the same level. I reviewed the CT scan as well which certainly shows an impacted femoral neck fracture.      IMPRESSION:  An 44-year-old female with an impacted left femoral neck fracture. PLAN:  I had a long discussion with the patient and her daughter. We discussed the nature of this particular fracture. The recommendation is for surgical intervention for percutaneous screw fixation for an impacted fracture. We discussed the technical aspects of that surgery. We discussed the risks which include, but not limited to, infection, nerve damage, vessel damage, malunion, nonunion, loss of fixation requiring further surgery including arthroplasty, wound healing issues, DVT, pulmonary embolus, medical and/or anesthetic complications, among others. We discussed the anticipated postoperative recovery. All questions were answered. We will plan on proceeding with her percutaneous screw fixation of her left impacted femoral neck fracture later this evening.      Dictated By Misha Daigle M.D.  d: 04/07/2023 19:09:59  t: 04/07/2023 20:29:01  Betty Jj [id number]  NX/

## 2023-04-08 NOTE — PLAN OF CARE
Pt received from PACU. A/Ox4. VSS. O2 2L per N/C. /Tele. Denies any pain currently. L hip with Aquacel dressing C/D/I. Ice applied to site. B/L SCDs in place. IVF and post-op IV abx. Call light within reach will cont to monitor.

## 2023-04-08 NOTE — BRIEF OP NOTE
Pre-Operative Diagnosis: Fracture [T14. 8XXA]     Post-Operative Diagnosis: Fracture [T14. 8XXA]      Procedure Performed:   LEFT HIP CANNULATED SCREW FEMORAL NECK    Surgeon(s) and Role:     * Travis Kramer MD - Primary    Assistant(s):        Surgical Findings: per op note     Specimen: none     Estimated Blood Loss: Blood Output: 60 mL (4/7/2023  8:55 PM)      Dictation Number:      Juanita Higgins MD  4/7/2023  9:19 PM

## 2023-04-09 LAB
ANION GAP SERPL CALC-SCNC: 8 MMOL/L (ref 0–18)
BUN BLD-MCNC: 27 MG/DL (ref 7–18)
CALCIUM BLD-MCNC: 8.8 MG/DL (ref 8.5–10.1)
CHLORIDE SERPL-SCNC: 107 MMOL/L (ref 98–112)
CO2 SERPL-SCNC: 26 MMOL/L (ref 21–32)
CREAT BLD-MCNC: 0.94 MG/DL
GFR SERPLBLD BASED ON 1.73 SQ M-ARVRAT: 61 ML/MIN/1.73M2 (ref 60–?)
GLUCOSE BLD-MCNC: 136 MG/DL (ref 70–99)
GLUCOSE BLD-MCNC: 142 MG/DL (ref 70–99)
GLUCOSE BLD-MCNC: 171 MG/DL (ref 70–99)
GLUCOSE BLD-MCNC: 174 MG/DL (ref 70–99)
GLUCOSE BLD-MCNC: 186 MG/DL (ref 70–99)
MAGNESIUM SERPL-MCNC: 2 MG/DL (ref 1.6–2.6)
OSMOLALITY SERPL CALC.SUM OF ELEC: 302 MOSM/KG (ref 275–295)
POTASSIUM SERPL-SCNC: 4.2 MMOL/L (ref 3.5–5.1)
SODIUM SERPL-SCNC: 141 MMOL/L (ref 136–145)

## 2023-04-09 PROCEDURE — 99231 SBSQ HOSP IP/OBS SF/LOW 25: CPT | Performed by: HOSPITALIST

## 2023-04-09 RX ORDER — AMLODIPINE BESYLATE 5 MG/1
10 TABLET ORAL DAILY
Status: DISCONTINUED | OUTPATIENT
Start: 2023-04-09 | End: 2023-04-10

## 2023-04-09 NOTE — CM/SW NOTE
SW presented pt and pt's dtr with ANIBAL choice list. SW will f/u with pt regarding ANIBAL choice.      STUART Hinson  Discharge Planner

## 2023-04-09 NOTE — CONSULTS
[facility] Pharmacy Note:  Renal Dose Adjustment for Metoclopramide (REGLAN)    Arron Cerna has been prescribed Metoclopramide (REGLAN) 10 mg every 8 hours as needed for nausea/vomiting,. Estimated Creatinine Clearance: 35.9 mL/min (A) (based on SCr of 1.06 mg/dL (H)). Calculated creatinine clearance is < 40 ml/min, therefore, the dose of Metoclopramide (REGLAN) has been changed to 5 mg every 8 hours as needed for nausea/vomiting per P&T approved protocol. Pharmacy will continue to follow, and if renal function improves, will resume the original order.        Thank you,  Lilly Lares, PharmD  4/8/2023 9:11 PM

## 2023-04-09 NOTE — PLAN OF CARE
Pt received up in chair. VSS. Denies any pain. R hip dressing C/D/I. Ambulating with walker and assist x1 partial weight bearing to RLE. Voiding without any difficulty. Plan for ANIBAL at discharge.

## 2023-04-09 NOTE — CM/SW NOTE
04/09/23 1100   CM/SW Referral Data   Referral Source Nurse   Reason for Referral Discharge planning   Informant Patient;Clinical Staff Member;EMR   Patient Info   Patient's Current Mental Status at Time of Assessment Oriented; Alert   Patient's [address]   Patient lives with Alone   Patient Status Prior to Admission   Independent with ADLs and Mobility Yes   Discharge Needs   Anticipated D/C needs Subacute rehab   Services Requested   PASRR Level 1 Submitted Yes     HOME SITUATION  Type of Home: House   Home Layout: One level (with basement laundry)  Stairs to Enter : 3  Railing: Yes  Stairs to Bedroom: 0     Lives With: Alone  Drives: Yes  Patient Owned Equipment: Cane;Rollator     Prior Level of [place] per PT eval: per pt reports, prior to fall, pt was I with amb without assistive device. Pt was driving prior to fall. Pt lives in [place] in one level home with 1 flight of stairs to basement laundry, 3 [address] with one rail. Pt reports she has friends/neigbors that can assist once pt returns home. Pt here visiting family, pt unable to stay with family at this time due to 2 large dogs in the home. Pt is 82 y/o female admitted for left hip fx, s/p ORIF. Noted PT is recommending ANIBAL. PT is A/Ox4. SW spoke with pt via phone to discuss discharge planning. Pt lives alone and is normally independent with ADLs. Pt stated she lives in [place] but is visiting her family in [place]. Pt stated she is unable to stay with her dtr as the family works during the day and she will be without assistance, and the family has two large dogs. Discussed PT recommendation for ANIBAL, pt agreeable. Pt stated she plans to return to [place] after completion of ANIBAL. SW inquired if pt is able to stay with family if unable to return to [place] immediately after ANIBAL. Pt stated if she is able to manage on her own and the dogs are in the crate during the day, she can stay with her dtr.  ANIBAL referrals sent in [facility], choice list will be provided to pt once available. SW will continue to follow.      STUART Merida  Discharge Planner

## 2023-04-09 NOTE — PLAN OF CARE
Patient pod2 L femur surgery. Dressing dry and intact. Vitals stable, pain controlled.     Problem: Patient/Family Goals  Goal: Patient/Family Long Term Goal  Description: Patient's Long Term Goal: To be healed and back to normal activity    Interventions:  -Pain management  -PT/OT  -Follow up with PCP/Ortho as recommended    Outcome: Progressing  Goal: Patient/Family Short Term Goal  Description: Patients Short Term goal: to be able to eat, drink, go to the bathroom, change positions, sit in chair and work with therapy with pain controlled    Interventions:  -Pain management  -Activity as tolerated  -PT/OT  -Follow up with PCP/Ortho as recommended    Outcome: Progressing     Problem: Diabetes/Glucose Control  Goal: Glucose maintained within prescribed range  Description: INTERVENTIONS:  - Monitor Blood Glucose as ordered  - Assess for signs and symptoms of hyperglycemia and hypoglycemia  - Administer ordered medications to maintain glucose within target range  - Assess barriers to adequate nutritional intake and initiate nutrition consult as needed  - Instruct patient on self management of diabetes  Outcome: Progressing     Problem: PAIN - ADULT  Goal: Verbalizes/displays adequate comfort level or patient's stated pain goal  Description: INTERVENTIONS:  - Encourage pt to monitor pain and request assistance  - Assess pain using appropriate pain scale  - Administer analgesics based on type and severity of pain and evaluate response  - Implement non-pharmacological measures as appropriate and evaluate response  - Consider cultural and social influences on pain and pain management  - Manage/alleviate anxiety  - Utilize distraction and/or relaxation techniques  - Monitor for opioid side effects  - Notify MD/LIP if interventions unsuccessful or patient reports new pain  - Anticipate increased pain with activity and pre-medicate as appropriate  Outcome: Progressing     Problem: SAFETY ADULT - FALL  Goal: Free from fall injury  Description: INTERVENTIONS:  - Assess pt frequently for physical needs  - Identify cognitive and physical deficits and behaviors that affect risk of falls.   - [place] fall precautions as indicated by assessment.  - Educate pt/family on patient safety including physical limitations  - Instruct pt to call for assistance with activity based on assessment  - Modify environment to reduce risk of injury  - Provide assistive devices as appropriate  - Consider OT/PT consult to assist with strengthening/mobility  - Encourage toileting schedule  Outcome: Progressing     Problem: DISCHARGE PLANNING  Goal: Discharge to home or other facility with appropriate resources  Description: INTERVENTIONS:  - Identify barriers to discharge w/pt and caregiver  - Include patient/family/discharge partner in discharge planning  - Arrange for needed discharge resources and transportation as appropriate  - Identify discharge learning needs (meds, wound care, etc)  - Arrange for interpreters to assist at discharge as needed  - Consider post-discharge preferences of patient/family/discharge partner  - Complete POLST form as appropriate  - Assess patient's ability to be responsible for managing their own health  - Refer to Case Management Department for coordinating discharge planning if the patient needs post-hospital services based on physician/LIP order or complex needs related to functional status, cognitive ability or social support system  Outcome: Progressing

## 2023-04-10 ENCOUNTER — PATIENT OUTREACH (OUTPATIENT)
Dept: CASE MANAGEMENT | Age: 83
End: 2023-04-10

## 2023-04-10 VITALS
TEMPERATURE: 99 F | DIASTOLIC BLOOD PRESSURE: 61 MMHG | SYSTOLIC BLOOD PRESSURE: 133 MMHG | BODY MASS INDEX: 29.66 KG/M2 | HEIGHT: 64 IN | HEART RATE: 70 BPM | WEIGHT: 173.75 LBS | OXYGEN SATURATION: 94 % | RESPIRATION RATE: 26 BRPM

## 2023-04-10 LAB
GLUCOSE BLD-MCNC: 152 MG/DL (ref 70–99)
GLUCOSE BLD-MCNC: 160 MG/DL (ref 70–99)

## 2023-04-10 PROCEDURE — 99239 HOSP IP/OBS DSCHRG MGMT >30: CPT | Performed by: HOSPITALIST

## 2023-04-10 RX ORDER — DOCUSATE SODIUM 100 MG/1
100 CAPSULE, LIQUID FILLED ORAL DAILY
Status: DISCONTINUED | OUTPATIENT
Start: 2023-04-11 | End: 2023-04-10

## 2023-04-10 RX ORDER — RAMIPRIL 2.5 MG/1
2.5 CAPSULE ORAL DAILY
Qty: 30 CAPSULE | Refills: 0 | Status: SHIPPED | OUTPATIENT
Start: 2023-04-10

## 2023-04-10 RX ORDER — RAMIPRIL 2.5 MG/1
2.5 CAPSULE ORAL DAILY
Status: DISCONTINUED | OUTPATIENT
Start: 2023-04-10 | End: 2023-04-10

## 2023-04-10 NOTE — PHYSICAL THERAPY NOTE
PHYSICAL THERAPY TREATMENT NOTE - INPATIENT    Room Number: 383/383-A     Session: 1     Number of Visits to Meet Established Goals: 3    Presenting Problem: s/p percutaneous screw fixation, L impacted femoral neck fx due to fall 23  Co-Morbidities : hx of L TKR  ASSESSMENT     Pt continues to present with impaired strength and decreased ROM LLE , decreased endurance and impaired balance below PLOF s/p percutaneous scew fixation of L femur 23 . Pt will continue to benefit from ongoing IP PT to maximize functional independence. The AM-PAC '6-Clicks' Inpatient Basic Mobility Short Form was completed and this patient is demonstrating a 50% degree of impairment in mobility. Research supports that patients with this level of impairment may benefit from ANIBAL. DISCHARGE RECOMMENDATIONS  PT Discharge Recommendations: Sub-acute rehabilitation     PLAN  PT Treatment Plan: Bed mobility; Energy conservation;Patient education; Family education;Gait training;Neuromuscular re-educate;Strengthening;Transfer training;Balance training  Rehab Potential : Good  Frequency (Obs): 3-5x/week    CURRENT GOALS        Goal #1 Patient is able to demonstrate supine - sit EOB @ level: modified independent      Goal #2 Patient is able to demonstrate transfers Sit to/from Stand at assistance level: modified independent      Goal #3 Patient is able to ambulate 50 feet with assist device: walker - rolling at assistance level: modified independent      Goal #4 Pt able to ascend/descend 3 steps with one rail and cane with SBA, maintaining 50% WBing L LE.    Goal #5     Goal #6     Goal Comments: Goals established on 2023      4/10/2023 all goals ongoing      SUBJECTIVE  \"my arms get tired after a while\"     OBJECTIVE  Precautions: Bed/chair alarm    WEIGHT BEARING RESTRICTION  Weight Bearing Restriction: L lower extremity           L Lower Extremity: Partial Weight Bearing 50%    PAIN ASSESSMENT   Ratin  Location: denies pain at rest       BALANCE                                                                                                                       Static Sitting: Fair +  Dynamic Sitting: Fair +           Static Standing: Fair -  Dynamic Standing: Fair -    ACTIVITY TOLERANCE                         O2 WALK         AM-PAC '6-Clicks' INPATIENT SHORT FORM - BASIC MOBILITY  How much difficulty does the patient currently have. .. Patient Difficulty: Turning over in bed (including adjusting bedclothes, sheets and blankets)?: A Little   Patient Difficulty: Sitting down on and standing up from a chair with arms (e.g., wheelchair, bedside commode, etc.): A Little   Patient Difficulty: Moving from lying on back to sitting on the side of the bed?: A Little   How much help from another person does the patient currently need. .. Help from Another: Moving to and from a bed to a chair (including a wheelchair)?: A Little   Help from Another: Need to walk in hospital room?: A Little   Help from Another: Climbing 3-5 steps with a railing?: A Lot       AM-PAC Score:  Raw Score: 17   Approx Degree of Impairment: 50.57%   Standardized Score (AM-PAC Scale): 42.13   CMS Modifier (G-Code): CK    FUNCTIONAL ABILITY STATUS  Gait Assessment   Functional Mobility/Gait Assessment  Gait Assistance: Contact guard assist;Supervision  Distance (ft): 250  Assistive Device: Rolling walker  Pattern:  (50% WB LLE)    Skilled Therapy Provided  Pt presents seated in BS chair. Educated pt on PWB LLE 50%. Pt gait trained in [place] c  CGA , standing rest breaks required, pt maintaining 50% WB and NWB with turns. Toilet t/f CGA to supervision, pt ind c mary care. Supervision at sink for hand hygiene. Pt performed seated therex BUE BLe.     Bed Mobility:  Rolling:    Supine<>Sit:    Sit<>Supine:      Transfer Mobility:  Sit<>Stand: CGA    Stand<>Sit: supervision cues to reach back    Gait: CGA           THERAPEUTIC EXERCISES  Lower Extremity Ankle pumps  Glut sets  Heel slides  Quad sets     Upper Extremity chair push ups      Position Sitting     Repetitions   10   Sets   1     Patient End of Session: Up in chair;Needs met;Call light within reach;RN aware of session/findings; All patient questions and concerns addressed    PT Session Time: 30 minutes  Gait Training: 10 minutes  Therapeutic Activity: 10 minutes  Therapeutic Exercise: 10 minutes   Neuromuscular Re-education:  minutes

## 2023-04-10 NOTE — PROGRESS NOTES
Patient discharged to [place] [id number] via [facility]. Paperwork sent. Report called in to the [place]. Vitals stable. Dressing dry and intact.

## 2023-04-10 NOTE — PLAN OF CARE
Patient awaiting discharge. Pain controlled, vitals stable. Dressing dry and intact.      Problem: Patient/Family Goals  Goal: Patient/Family Long Term Goal  Description: Patient's Long Term Goal: To be healed and back to normal activity    Interventions:  -Pain management  -PT/OT  -Follow up with PCP/Ortho as recommended    Outcome: Progressing  Goal: Patient/Family Short Term Goal  Description: Patients Short Term goal: to be able to eat, drink, go to the bathroom, change positions, sit in chair and work with therapy with pain controlled    Interventions:  -Pain management  -Activity as tolerated  -PT/OT  -Follow up with PCP/Ortho as recommended    Outcome: Progressing     Problem: Diabetes/Glucose Control  Goal: Glucose maintained within prescribed range  Description: INTERVENTIONS:  - Monitor Blood Glucose as ordered  - Assess for signs and symptoms of hyperglycemia and hypoglycemia  - Administer ordered medications to maintain glucose within target range  - Assess barriers to adequate nutritional intake and initiate nutrition consult as needed  - Instruct patient on self management of diabetes  Outcome: Progressing     Problem: PAIN - ADULT  Goal: Verbalizes/displays adequate comfort level or patient's stated pain goal  Description: INTERVENTIONS:  - Encourage pt to monitor pain and request assistance  - Assess pain using appropriate pain scale  - Administer analgesics based on type and severity of pain and evaluate response  - Implement non-pharmacological measures as appropriate and evaluate response  - Consider cultural and social influences on pain and pain management  - Manage/alleviate anxiety  - Utilize distraction and/or relaxation techniques  - Monitor for opioid side effects  - Notify MD/LIP if interventions unsuccessful or patient reports new pain  - Anticipate increased pain with activity and pre-medicate as appropriate  Outcome: Progressing     Problem: SAFETY ADULT - FALL  Goal: Free from fall injury  Description: INTERVENTIONS:  - Assess pt frequently for physical needs  - Identify cognitive and physical deficits and behaviors that affect risk of falls.   - [place] fall precautions as indicated by assessment.  - Educate pt/family on patient safety including physical limitations  - Instruct pt to call for assistance with activity based on assessment  - Modify environment to reduce risk of injury  - Provide assistive devices as appropriate  - Consider OT/PT consult to assist with strengthening/mobility  - Encourage toileting schedule  Outcome: Progressing     Problem: DISCHARGE PLANNING  Goal: Discharge to home or other facility with appropriate resources  Description: INTERVENTIONS:  - Identify barriers to discharge w/pt and caregiver  - Include patient/family/discharge partner in discharge planning  - Arrange for needed discharge resources and transportation as appropriate  - Identify discharge learning needs (meds, wound care, etc)  - Arrange for interpreters to assist at discharge as needed  - Consider post-discharge preferences of patient/family/discharge partner  - Complete POLST form as appropriate  - Assess patient's ability to be responsible for managing their own health  - Refer to Case Management Department for coordinating discharge planning if the patient needs post-hospital services based on physician/LIP order or complex needs related to functional status, cognitive ability or social support system  Outcome: Progressing

## 2023-04-10 NOTE — CM/SW NOTE
Met with pt/dtr at bedside. Confirmed preference for [facility]. Reviewed transportation options and costs for [employer]. Discussed anticipated DC today. Spoke with Claudia from [facility] who confirmed pt can be accepted for admission today. [facility] transport scheduled for 2pm.  PCS form completed and available for RN to print. Updated RN and pt/dtr. / to remain available for support and/or discharge planning.      The [facility] at [address] [facility]  [address], [facility]  Discharge Planner  [phone]

## 2023-04-10 NOTE — PLAN OF CARE
A/o x4. RA/. SCD's/ankle pumps encouraged. Tele. 1800 qid accucheck. LBM 4/8. Voiding. IV SL. Pain managed with po tylenol. Pt declines narcotics states \"makes me fuzzy\" IV SL. PWB 50% up x 1 with RW. POC updated with pt. All safety measures in place. Call light within reach instructed pt to call for help or assistance.

## 2023-04-11 ENCOUNTER — EXTERNAL FACILITY (OUTPATIENT)
Dept: FAMILY MEDICINE CLINIC | Facility: CLINIC | Age: 83
End: 2023-04-11

## 2023-04-11 DIAGNOSIS — E11.65 TYPE 2 DIABETES MELLITUS WITH HYPERGLYCEMIA, WITHOUT LONG-TERM CURRENT USE OF INSULIN (HCC): ICD-10-CM

## 2023-04-11 DIAGNOSIS — I10 ESSENTIAL HYPERTENSION: ICD-10-CM

## 2023-04-11 DIAGNOSIS — Z86.79 H/O SICK SINUS SYNDROME: ICD-10-CM

## 2023-04-11 DIAGNOSIS — S72.002A CLOSED FRACTURE OF NECK OF LEFT FEMUR, INITIAL ENCOUNTER (HCC): Primary | ICD-10-CM

## 2023-04-11 DIAGNOSIS — K21.9 GASTROESOPHAGEAL REFLUX DISEASE, UNSPECIFIED WHETHER ESOPHAGITIS PRESENT: ICD-10-CM

## 2023-04-11 PROCEDURE — 1111F DSCHRG MED/CURRENT MED MERGE: CPT | Performed by: FAMILY MEDICINE

## 2023-04-11 PROCEDURE — 99306 1ST NF CARE HIGH MDM 50: CPT | Performed by: FAMILY MEDICINE

## 2023-04-12 ENCOUNTER — INITIAL APN SNF VISIT (OUTPATIENT)
Dept: INTERNAL MEDICINE CLINIC | Age: 83
End: 2023-04-12

## 2023-04-12 VITALS
BODY MASS INDEX: 30 KG/M2 | SYSTOLIC BLOOD PRESSURE: 118 MMHG | TEMPERATURE: 98 F | RESPIRATION RATE: 18 BRPM | OXYGEN SATURATION: 93 % | WEIGHT: 174 LBS | HEART RATE: 94 BPM | DIASTOLIC BLOOD PRESSURE: 81 MMHG
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
## 2023-04-12 DIAGNOSIS — S72.002S CLOSED FRACTURE OF LEFT HIP REQUIRING OPERATIVE REPAIR, SEQUELA: Primary | ICD-10-CM

## 2023-04-12 DIAGNOSIS — E11.9 CONTROLLED TYPE 2 DIABETES MELLITUS WITHOUT COMPLICATION, WITHOUT LONG-TERM CURRENT USE OF INSULIN (HCC): ICD-10-CM

## 2023-04-12 DIAGNOSIS — K21.9 GASTROESOPHAGEAL REFLUX DISEASE, UNSPECIFIED WHETHER ESOPHAGITIS PRESENT: ICD-10-CM

## 2023-04-12 DIAGNOSIS — I10 PRIMARY HYPERTENSION: ICD-10-CM

## 2023-04-12 DIAGNOSIS — I49.5 SSS (SICK SINUS SYNDROME) (HCC): ICD-10-CM

## 2023-04-12 DIAGNOSIS — R53.1 WEAKNESS: ICD-10-CM

## 2023-04-12 DIAGNOSIS — Z78.9 DECREASED ACTIVITIES OF DAILY LIVING (ADL): ICD-10-CM

## 2023-04-12 PROCEDURE — 1111F DSCHRG MED/CURRENT MED MERGE: CPT | Performed by: NURSE PRACTITIONER

## 2023-04-12 PROCEDURE — 1124F ACP DISCUSS-NO DSCNMKR DOCD: CPT | Performed by: NURSE PRACTITIONER

## 2023-04-12 PROCEDURE — 99310 SBSQ NF CARE HIGH MDM 45: CPT | Performed by: NURSE PRACTITIONER

## 2023-04-12 RX ORDER — ACETAMINOPHEN 325 MG/1
650 TABLET ORAL EVERY 6 HOURS PRN
COMMUNITY

## 2023-04-12 RX ORDER — POLYETHYLENE GLYCOL 3350 17 G/17G
17 POWDER, FOR SOLUTION ORAL DAILY PRN
COMMUNITY

## 2023-04-17 ENCOUNTER — SNF VISIT (OUTPATIENT)
Dept: INTERNAL MEDICINE CLINIC | Age: 83
End: 2023-04-17

## 2023-04-17 VITALS
TEMPERATURE: 97 F | SYSTOLIC BLOOD PRESSURE: 131 MMHG | DIASTOLIC BLOOD PRESSURE: 57 MMHG | WEIGHT: 164.38 LBS | OXYGEN SATURATION: 96 % | HEART RATE: 67 BPM | RESPIRATION RATE: 18 BRPM | BODY MASS INDEX: 28 KG/M2

## 2023-04-17 DIAGNOSIS — E11.9 CONTROLLED TYPE 2 DIABETES MELLITUS WITHOUT COMPLICATION, WITHOUT LONG-TERM CURRENT USE OF INSULIN (HCC): ICD-10-CM

## 2023-04-17 DIAGNOSIS — I49.5 SSS (SICK SINUS SYNDROME) (HCC): ICD-10-CM

## 2023-04-17 DIAGNOSIS — S72.002S CLOSED FRACTURE OF LEFT HIP REQUIRING OPERATIVE REPAIR, SEQUELA: Primary | ICD-10-CM

## 2023-04-17 DIAGNOSIS — R53.1 WEAKNESS: ICD-10-CM

## 2023-04-17 DIAGNOSIS — I10 PRIMARY HYPERTENSION: ICD-10-CM

## 2023-04-17 DIAGNOSIS — L02.211 CUTANEOUS ABSCESS OF ABDOMINAL WALL: ICD-10-CM

## 2023-04-19 ENCOUNTER — SNF VISIT (OUTPATIENT)
Dept: INTERNAL MEDICINE CLINIC | Age: 83
End: 2023-04-19

## 2023-04-19 VITALS
DIASTOLIC BLOOD PRESSURE: 75 MMHG | RESPIRATION RATE: 18 BRPM | TEMPERATURE: 97 F | BODY MASS INDEX: 28 KG/M2 | SYSTOLIC BLOOD PRESSURE: 134 MMHG | OXYGEN SATURATION: 96 % | WEIGHT: 164.63 LBS | HEART RATE: 78 BPM

## 2023-04-19 DIAGNOSIS — Z78.9 DECREASED ACTIVITIES OF DAILY LIVING (ADL): ICD-10-CM

## 2023-04-19 DIAGNOSIS — S72.002S CLOSED FRACTURE OF LEFT HIP REQUIRING OPERATIVE REPAIR, SEQUELA: ICD-10-CM

## 2023-04-19 DIAGNOSIS — E11.9 CONTROLLED TYPE 2 DIABETES MELLITUS WITHOUT COMPLICATION, WITHOUT LONG-TERM CURRENT USE OF INSULIN (HCC): ICD-10-CM

## 2023-04-19 DIAGNOSIS — R53.1 WEAKNESS: ICD-10-CM

## 2023-04-19 DIAGNOSIS — I10 PRIMARY HYPERTENSION: Primary | ICD-10-CM

## 2023-04-19 PROCEDURE — 1111F DSCHRG MED/CURRENT MED MERGE: CPT | Performed by: NURSE PRACTITIONER

## 2023-04-19 PROCEDURE — 99309 SBSQ NF CARE MODERATE MDM 30: CPT | Performed by: NURSE PRACTITIONER

## 2023-04-21 ENCOUNTER — APPOINTMENT (OUTPATIENT)
Dept: ULTRASOUND IMAGING | Facility: HOSPITAL | Age: 83
DRG: 522 | End: 2023-04-21
Attending: EMERGENCY MEDICINE
Payer: MEDICARE

## 2023-04-21 ENCOUNTER — HOSPITAL ENCOUNTER (INPATIENT)
Facility: HOSPITAL | Age: 83
LOS: 3 days | Discharge: NURSING, REHAB OR LONG-TERM CARE | DRG: 522 | End: 2023-04-24
Attending: EMERGENCY MEDICINE | Admitting: INTERNAL MEDICINE
Payer: MEDICARE

## 2023-04-21 DIAGNOSIS — S72.002K CLOSED FRACTURE OF LEFT HIP WITH NONUNION, SUBSEQUENT ENCOUNTER: Primary | ICD-10-CM

## 2023-04-21 DIAGNOSIS — M79.89 LEG SWELLING: ICD-10-CM

## 2023-04-21 PROBLEM — L73.9 FOLLICULITIS OF PERINEUM: Status: ACTIVE | Noted: 2023-04-21

## 2023-04-21 PROBLEM — F32.A DEPRESSION: Chronic | Status: ACTIVE | Noted: 2023-04-21

## 2023-04-21 PROBLEM — I10 PRIMARY HYPERTENSION: Chronic | Status: ACTIVE | Noted: 2023-04-21

## 2023-04-21 LAB
ALBUMIN SERPL-MCNC: 3.3 G/DL (ref 3.4–5)
ALBUMIN/GLOB SERPL: 0.8 {RATIO} (ref 1–2)
ALP LIVER SERPL-CCNC: 127 U/L
ALT SERPL-CCNC: 23 U/L
ANION GAP SERPL CALC-SCNC: 4 MMOL/L (ref 0–18)
ANTIBODY SCREEN: NEGATIVE
APTT PPP: 30.8 SECONDS (ref 23.3–35.6)
AST SERPL-CCNC: 26 U/L (ref 15–37)
BASOPHILS # BLD AUTO: 0.05 X10(3) UL (ref 0–0.2)
BASOPHILS NFR BLD AUTO: 0.5 %
BILIRUB SERPL-MCNC: 0.5 MG/DL (ref 0.1–2)
BUN BLD-MCNC: 27 MG/DL (ref 7–18)
CALCIUM BLD-MCNC: 9.4 MG/DL (ref 8.5–10.1)
CHLORIDE SERPL-SCNC: 110 MMOL/L (ref 98–112)
CO2 SERPL-SCNC: 25 MMOL/L (ref 21–32)
CREAT BLD-MCNC: 1.08 MG/DL
EOSINOPHIL # BLD AUTO: 0.16 X10(3) UL (ref 0–0.7)
EOSINOPHIL NFR BLD AUTO: 1.7 %
ERYTHROCYTE [DISTWIDTH] IN BLOOD BY AUTOMATED COUNT: 13.5 %
GFR SERPLBLD BASED ON 1.73 SQ M-ARVRAT: 52 ML/MIN/1.73M2 (ref 60–?)
GLOBULIN PLAS-MCNC: 3.9 G/DL (ref 2.8–4.4)
GLUCOSE BLD-MCNC: 115 MG/DL (ref 70–99)
GLUCOSE BLD-MCNC: 177 MG/DL (ref 70–99)
GLUCOSE BLD-MCNC: 86 MG/DL (ref 70–99)
HCT VFR BLD AUTO: 38.9 %
HGB BLD-MCNC: 13.1 G/DL
IMM GRANULOCYTES # BLD AUTO: 0.04 X10(3) UL (ref 0–1)
IMM GRANULOCYTES NFR BLD: 0.4 %
INR BLD: 1.29 (ref 0.85–1.16)
LYMPHOCYTES # BLD AUTO: 1.51 X10(3) UL (ref 1–4)
LYMPHOCYTES NFR BLD AUTO: 16.2 %
MCH RBC QN AUTO: 30.3 PG (ref 26–34)
MCHC RBC AUTO-ENTMCNC: 33.7 G/DL (ref 31–37)
MCV RBC AUTO: 90 FL
MONOCYTES # BLD AUTO: 0.73 X10(3) UL (ref 0.1–1)
MONOCYTES NFR BLD AUTO: 7.8 %
MRSA NASAL: NEGATIVE
NEUTROPHILS # BLD AUTO: 6.81 X10 (3) UL (ref 1.5–7.7)
NEUTROPHILS # BLD AUTO: 6.81 X10(3) UL (ref 1.5–7.7)
NEUTROPHILS NFR BLD AUTO: 73.4 %
OSMOLALITY SERPL CALC.SUM OF ELEC: 294 MOSM/KG (ref 275–295)
PLATELET # BLD AUTO: 261 10(3)UL (ref 150–450)
POTASSIUM SERPL-SCNC: 4 MMOL/L (ref 3.5–5.1)
PROT SERPL-MCNC: 7.2 G/DL (ref 6.4–8.2)
PROTHROMBIN TIME: 16 SECONDS (ref 11.6–14.8)
RBC # BLD AUTO: 4.32 X10(6)UL
RH BLOOD TYPE: POSITIVE
SARS-COV-2 RNA RESP QL NAA+PROBE: NOT DETECTED
SODIUM SERPL-SCNC: 139 MMOL/L (ref 136–145)
STAPH A BY PCR: NEGATIVE
WBC # BLD AUTO: 9.3 X10(3) UL (ref 4–11)

## 2023-04-21 PROCEDURE — 99223 1ST HOSP IP/OBS HIGH 75: CPT | Performed by: INTERNAL MEDICINE

## 2023-04-21 PROCEDURE — 93971 EXTREMITY STUDY: CPT | Performed by: EMERGENCY MEDICINE

## 2023-04-21 RX ORDER — MORPHINE SULFATE 2 MG/ML
1 INJECTION, SOLUTION INTRAMUSCULAR; INTRAVENOUS EVERY 2 HOUR PRN
Status: DISCONTINUED | OUTPATIENT
Start: 2023-04-21 | End: 2023-04-24

## 2023-04-21 RX ORDER — ACETAMINOPHEN 325 MG/1
650 TABLET ORAL EVERY 4 HOURS PRN
Status: DISCONTINUED | OUTPATIENT
Start: 2023-04-21 | End: 2023-04-24

## 2023-04-21 RX ORDER — SODIUM CHLORIDE 9 MG/ML
INJECTION, SOLUTION INTRAVENOUS CONTINUOUS
Status: ACTIVE | OUTPATIENT
Start: 2023-04-21 | End: 2023-04-21

## 2023-04-21 RX ORDER — DOXYCYCLINE HYCLATE 100 MG/1
100 CAPSULE ORAL 2 TIMES DAILY
Status: DISCONTINUED | OUTPATIENT
Start: 2023-04-21 | End: 2023-04-24

## 2023-04-21 RX ORDER — DEXTROSE MONOHYDRATE 25 G/50ML
50 INJECTION, SOLUTION INTRAVENOUS
Status: DISCONTINUED | OUTPATIENT
Start: 2023-04-21 | End: 2023-04-24

## 2023-04-21 RX ORDER — SODIUM CHLORIDE 9 MG/ML
1000 INJECTION, SOLUTION INTRAVENOUS ONCE
Status: COMPLETED | OUTPATIENT
Start: 2023-04-21 | End: 2023-04-21

## 2023-04-21 RX ORDER — HYDROCODONE BITARTRATE AND ACETAMINOPHEN 5; 325 MG/1; MG/1
2 TABLET ORAL EVERY 4 HOURS PRN
Status: DISCONTINUED | OUTPATIENT
Start: 2023-04-21 | End: 2023-04-24

## 2023-04-21 RX ORDER — AMLODIPINE BESYLATE 10 MG/1
10 TABLET ORAL DAILY
Status: DISCONTINUED | OUTPATIENT
Start: 2023-04-21 | End: 2023-04-24

## 2023-04-21 RX ORDER — ACETAMINOPHEN 500 MG
1000 TABLET ORAL ONCE
Status: COMPLETED | OUTPATIENT
Start: 2023-04-21 | End: 2023-04-21

## 2023-04-21 RX ORDER — POLYETHYLENE GLYCOL 3350 17 G/17G
17 POWDER, FOR SOLUTION ORAL DAILY PRN
Status: DISCONTINUED | OUTPATIENT
Start: 2023-04-21 | End: 2023-04-23 | Stop reason: SDUPTHER

## 2023-04-21 RX ORDER — CEFAZOLIN SODIUM/WATER 2 G/20 ML
2 SYRINGE (ML) INTRAVENOUS
Status: COMPLETED | OUTPATIENT
Start: 2023-04-23 | End: 2023-04-23

## 2023-04-21 RX ORDER — PANTOPRAZOLE SODIUM 20 MG/1
20 TABLET, DELAYED RELEASE ORAL DAILY
Status: DISCONTINUED | OUTPATIENT
Start: 2023-04-21 | End: 2023-04-24

## 2023-04-21 RX ORDER — ESCITALOPRAM OXALATE 10 MG/1
10 TABLET ORAL DAILY
Status: DISCONTINUED | OUTPATIENT
Start: 2023-04-21 | End: 2023-04-24

## 2023-04-21 RX ORDER — MORPHINE SULFATE 4 MG/ML
4 INJECTION, SOLUTION INTRAMUSCULAR; INTRAVENOUS EVERY 2 HOUR PRN
Status: DISCONTINUED | OUTPATIENT
Start: 2023-04-21 | End: 2023-04-24

## 2023-04-21 RX ORDER — ONDANSETRON 2 MG/ML
4 INJECTION INTRAMUSCULAR; INTRAVENOUS EVERY 6 HOURS PRN
Status: DISCONTINUED | OUTPATIENT
Start: 2023-04-21 | End: 2023-04-23 | Stop reason: SDUPTHER

## 2023-04-21 RX ORDER — HYDROCODONE BITARTRATE AND ACETAMINOPHEN 5; 325 MG/1; MG/1
1 TABLET ORAL EVERY 4 HOURS PRN
Status: DISCONTINUED | OUTPATIENT
Start: 2023-04-21 | End: 2023-04-24

## 2023-04-21 RX ORDER — MELATONIN
3 NIGHTLY PRN
Status: DISCONTINUED | OUTPATIENT
Start: 2023-04-21 | End: 2023-04-24

## 2023-04-21 RX ORDER — NICOTINE POLACRILEX 4 MG
15 LOZENGE BUCCAL
Status: DISCONTINUED | OUTPATIENT
Start: 2023-04-21 | End: 2023-04-24

## 2023-04-21 RX ORDER — MORPHINE SULFATE 2 MG/ML
2 INJECTION, SOLUTION INTRAMUSCULAR; INTRAVENOUS EVERY 2 HOUR PRN
Status: DISCONTINUED | OUTPATIENT
Start: 2023-04-21 | End: 2023-04-24

## 2023-04-21 RX ORDER — NICOTINE POLACRILEX 4 MG
30 LOZENGE BUCCAL
Status: DISCONTINUED | OUTPATIENT
Start: 2023-04-21 | End: 2023-04-24

## 2023-04-21 RX ORDER — DOXYCYCLINE HYCLATE 100 MG/1
100 CAPSULE ORAL 2 TIMES DAILY
COMMUNITY
Start: 2023-04-21 | End: 2023-05-01 | Stop reason: ALTCHOICE

## 2023-04-21 RX ORDER — CHOLESTYRAMINE LIGHT 4 G/5.7G
4 POWDER, FOR SUSPENSION ORAL DAILY
Status: DISCONTINUED | OUTPATIENT
Start: 2023-04-21 | End: 2023-04-24

## 2023-04-21 RX ORDER — RAMIPRIL 2.5 MG/1
2.5 CAPSULE ORAL DAILY
Status: DISCONTINUED | OUTPATIENT
Start: 2023-04-21 | End: 2023-04-24

## 2023-04-21 NOTE — ED INITIAL ASSESSMENT (HPI)
Pt states she was at the doctor regarding follow up on left hip surgery. Pt states she was sent here to r/o DVT to the left leg. Pt states \"I had a knee replacement a year ago and lately the calf just screams in pain and he wants to make sure I dont have a blood clot. \" Pt denies hx of blood clots.

## 2023-04-21 NOTE — ED QUICK NOTES
Orders for admission, patient is aware of plan and ready to go upstairs. Any questions, please call ED RN Linda Olmos  at extension [phone]. Vaccinated?  Yes  Type of COVID test sent: Rapid  COVID Suspicion level: Low      Titratable drug(s) infusin.9 NaCl  Rate: 83ml/hr    LOC at time of transport: A/Ox4    Other pertinent information: N/A    CIWA score= N/A  NIH score= N/A

## 2023-04-21 NOTE — CM/SW NOTE
04/21/23 1600   CM/SW Referral Data   Referral Source Social Work (self-referral)   Reason for Referral Discharge planning   Informant EMR;Clinical Staff Member   Patient Info   Patient's Home Environment Subacute Rehab   Post Acute Care Provider Upon Admission [facility]   Discharge Needs   Anticipated D/C needs Subacute rehab;Transportation services       Noted patient readmitted due to leg swelling and a non union of left hip fracture. Pt was recently in the hospital after a fall with hip fracture. She was discharged to [facility] for [facility]IBAL after hip ORIF. Pt now readmitted from MD office with plan for return to OR for MARBELLA on 4/23. Spoke with Scar Mattson from [facility] who confirmed pt has been in rehab there since her previous hospital discharge. Anticipate pt will need continued ANIBAL at [place]. Updates sent to [facility] via [address]. / to remain available for support and/or discharge planning.      The [facility] at [address] [facility]  Discharge Planner  [phone]

## 2023-04-22 ENCOUNTER — APPOINTMENT (OUTPATIENT)
Dept: GENERAL RADIOLOGY | Facility: HOSPITAL | Age: 83
DRG: 522 | End: 2023-04-22
Attending: ORTHOPAEDIC SURGERY
Payer: MEDICARE

## 2023-04-22 ENCOUNTER — ANESTHESIA EVENT (OUTPATIENT)
Dept: SURGERY | Facility: HOSPITAL | Age: 83
End: 2023-04-22
Payer: MEDICARE

## 2023-04-22 ENCOUNTER — APPOINTMENT (OUTPATIENT)
Dept: GENERAL RADIOLOGY | Facility: HOSPITAL | Age: 83
DRG: 522 | End: 2023-04-22
Attending: INTERNAL MEDICINE
Payer: MEDICARE

## 2023-04-22 LAB
ANION GAP SERPL CALC-SCNC: 5 MMOL/L (ref 0–18)
BASOPHILS # BLD AUTO: 0.05 X10(3) UL (ref 0–0.2)
BASOPHILS NFR BLD AUTO: 0.7 %
BUN BLD-MCNC: 19 MG/DL (ref 7–18)
CALCIUM BLD-MCNC: 8.4 MG/DL (ref 8.5–10.1)
CHLORIDE SERPL-SCNC: 113 MMOL/L (ref 98–112)
CO2 SERPL-SCNC: 25 MMOL/L (ref 21–32)
CREAT BLD-MCNC: 0.86 MG/DL
EOSINOPHIL # BLD AUTO: 0.44 X10(3) UL (ref 0–0.7)
EOSINOPHIL NFR BLD AUTO: 6 %
ERYTHROCYTE [DISTWIDTH] IN BLOOD BY AUTOMATED COUNT: 13.3 %
GFR SERPLBLD BASED ON 1.73 SQ M-ARVRAT: 68 ML/MIN/1.73M2 (ref 60–?)
GLUCOSE BLD-MCNC: 113 MG/DL (ref 70–99)
GLUCOSE BLD-MCNC: 118 MG/DL (ref 70–99)
GLUCOSE BLD-MCNC: 129 MG/DL (ref 70–99)
GLUCOSE BLD-MCNC: 130 MG/DL (ref 70–99)
GLUCOSE BLD-MCNC: 138 MG/DL (ref 70–99)
HCT VFR BLD AUTO: 36.4 %
HGB BLD-MCNC: 11.7 G/DL
IMM GRANULOCYTES # BLD AUTO: 0.03 X10(3) UL (ref 0–1)
IMM GRANULOCYTES NFR BLD: 0.4 %
LYMPHOCYTES # BLD AUTO: 1.82 X10(3) UL (ref 1–4)
LYMPHOCYTES NFR BLD AUTO: 24.7 %
MCH RBC QN AUTO: 29.7 PG (ref 26–34)
MCHC RBC AUTO-ENTMCNC: 32.1 G/DL (ref 31–37)
MCV RBC AUTO: 92.4 FL
MONOCYTES # BLD AUTO: 0.75 X10(3) UL (ref 0.1–1)
MONOCYTES NFR BLD AUTO: 10.2 %
NEUTROPHILS # BLD AUTO: 4.27 X10 (3) UL (ref 1.5–7.7)
NEUTROPHILS # BLD AUTO: 4.27 X10(3) UL (ref 1.5–7.7)
NEUTROPHILS NFR BLD AUTO: 58 %
OSMOLALITY SERPL CALC.SUM OF ELEC: 299 MOSM/KG (ref 275–295)
PLATELET # BLD AUTO: 244 10(3)UL (ref 150–450)
POTASSIUM SERPL-SCNC: 4.1 MMOL/L (ref 3.5–5.1)
RBC # BLD AUTO: 3.94 X10(6)UL
SODIUM SERPL-SCNC: 143 MMOL/L (ref 136–145)
WBC # BLD AUTO: 7.4 X10(3) UL (ref 4–11)

## 2023-04-22 PROCEDURE — 73502 X-RAY EXAM HIP UNI 2-3 VIEWS: CPT | Performed by: ORTHOPAEDIC SURGERY

## 2023-04-22 PROCEDURE — 99232 SBSQ HOSP IP/OBS MODERATE 35: CPT | Performed by: INTERNAL MEDICINE

## 2023-04-22 PROCEDURE — 71045 X-RAY EXAM CHEST 1 VIEW: CPT | Performed by: INTERNAL MEDICINE

## 2023-04-22 RX ORDER — HEPARIN SODIUM 5000 [USP'U]/ML
5000 INJECTION, SOLUTION INTRAVENOUS; SUBCUTANEOUS EVERY 8 HOURS SCHEDULED
Status: COMPLETED | OUTPATIENT
Start: 2023-04-22 | End: 2023-04-22

## 2023-04-22 NOTE — PHYSICAL THERAPY NOTE
PT orders received. Pt scheduled for hip replacement 4/23/23. PT to follow up after surgery. RN made aware. Will NNO.

## 2023-04-22 NOTE — PLAN OF CARE
Alert and oriented x4. VSS. On RA. . Telemetry monitoring. SCDs on BLE. Tolerating diet. Pain controlled with PRN medication. Steri strips to left hip C/D/I. Voiding via purewick. Plan is OR 4/23. Call light within reach.

## 2023-04-22 NOTE — PROGRESS NOTES
I have reviewed with patient the difference between Curtis versus total hip replacement. Explained in detail the difference and reasons for doing these procedures. Explained pros and cons. We will plan on left hip hemiarthroplasty for now. Plan on surgery for tomorrow. I marked her left hip. Encouraged all questions and answered them all. Addendum:  Daughter, John Morrissey, wanted to ask about curtis vs THR. I called and spoke to her. Discussed pros and cons abut these procedures. She is in agreement with hemiarthroplasty. Discussed DC plan as well. Advocated home discharge if possible.

## 2023-04-22 NOTE — OCCUPATIONAL THERAPY NOTE
Orders received charts reviewed. Pt awaiting hip replacement scheduled 4/23. OT will follow up after surgery.

## 2023-04-22 NOTE — PLAN OF CARE
A&Ox4. Vitals stable on room air. Steri strips on L Hip C/I/D. Last BM on 4/21/23. Voids with Purewick. Pain managed with prescribed medications. Call light within reach. Plan of care reviewed, patient verbalized understanding. OR scheduled for 4/23/2023 @ 0710. No further questions at this time.

## 2023-04-22 NOTE — PLAN OF CARE
ER direct admission. Patient alert and oriented x4. Vital signs within normal range. Room air. . Tele monitoring. Pacemaker/A-paced. Orders for non-weightbearing to left lower extremity. 1800 diet with accuchecks and carb-count. Consents for surgery signed and on chart. Updated daughter on plan of care.

## 2023-04-23 ENCOUNTER — ANESTHESIA (OUTPATIENT)
Dept: SURGERY | Facility: HOSPITAL | Age: 83
End: 2023-04-23
Payer: MEDICARE

## 2023-04-23 ENCOUNTER — APPOINTMENT (OUTPATIENT)
Dept: GENERAL RADIOLOGY | Facility: HOSPITAL | Age: 83
DRG: 522 | End: 2023-04-23
Attending: SPECIALIST/TECHNOLOGIST
Payer: MEDICARE

## 2023-04-23 LAB
GLUCOSE BLD-MCNC: 118 MG/DL (ref 70–99)
GLUCOSE BLD-MCNC: 134 MG/DL (ref 70–99)
GLUCOSE BLD-MCNC: 177 MG/DL (ref 70–99)
GLUCOSE BLD-MCNC: 225 MG/DL (ref 70–99)
GLUCOSE BLD-MCNC: 298 MG/DL (ref 70–99)

## 2023-04-23 PROCEDURE — 73501 X-RAY EXAM HIP UNI 1 VIEW: CPT | Performed by: SPECIALIST/TECHNOLOGIST

## 2023-04-23 PROCEDURE — 0SPB04Z REMOVAL OF INTERNAL FIXATION DEVICE FROM LEFT HIP JOINT, OPEN APPROACH: ICD-10-PCS | Performed by: ORTHOPAEDIC SURGERY

## 2023-04-23 PROCEDURE — 3E0T3BZ INTRODUCTION OF ANESTHETIC AGENT INTO PERIPHERAL NERVES AND PLEXI, PERCUTANEOUS APPROACH: ICD-10-PCS | Performed by: ORTHOPAEDIC SURGERY

## 2023-04-23 PROCEDURE — 76942 ECHO GUIDE FOR BIOPSY: CPT | Performed by: ANESTHESIOLOGY

## 2023-04-23 PROCEDURE — 0SRB0J9 REPLACEMENT OF LEFT HIP JOINT WITH SYNTHETIC SUBSTITUTE, CEMENTED, OPEN APPROACH: ICD-10-PCS | Performed by: ORTHOPAEDIC SURGERY

## 2023-04-23 DEVICE — SELF CENTERING BI-POLAR HEAD 28MM ID 46MM OD
Type: IMPLANTABLE DEVICE | Site: HIP | Status: FUNCTIONAL
Brand: SELF CENTERING

## 2023-04-23 DEVICE — M-SPEC METAL FEMORAL HEAD 12/14 TAPER DIAMETER 28MM +1.5: Type: IMPLANTABLE DEVICE | Site: HIP | Status: FUNCTIONAL

## 2023-04-23 DEVICE — C-STEM AMT CEMENTED STEM STANDARD OFFSET SIZE 3 12/14 TAPER
Type: IMPLANTABLE DEVICE | Site: HIP | Status: FUNCTIONAL
Brand: C-STEM

## 2023-04-23 DEVICE — C-STEM VOID CENTRALISER 10MM
Type: IMPLANTABLE DEVICE | Site: HIP | Status: FUNCTIONAL
Brand: C-STEM

## 2023-04-23 DEVICE — IMPLANTABLE DEVICE
Type: IMPLANTABLE DEVICE | Site: HIP | Status: FUNCTIONAL
Brand: QUIK-USE®

## 2023-04-23 DEVICE — SMARTSET HV HIGH VISCOSITY BONE CEMENT 40G
Type: IMPLANTABLE DEVICE | Site: HIP | Status: FUNCTIONAL
Brand: SMARTSET

## 2023-04-23 RX ORDER — HYDROMORPHONE HYDROCHLORIDE 1 MG/ML
0.4 INJECTION, SOLUTION INTRAMUSCULAR; INTRAVENOUS; SUBCUTANEOUS EVERY 2 HOUR PRN
Status: DISCONTINUED | OUTPATIENT
Start: 2023-04-23 | End: 2023-04-24

## 2023-04-23 RX ORDER — ONDANSETRON 2 MG/ML
INJECTION INTRAMUSCULAR; INTRAVENOUS AS NEEDED
Status: DISCONTINUED | OUTPATIENT
Start: 2023-04-23 | End: 2023-04-23 | Stop reason: SURG

## 2023-04-23 RX ORDER — OXYCODONE HYDROCHLORIDE 5 MG/1
2.5 TABLET ORAL EVERY 4 HOURS PRN
Status: DISCONTINUED | OUTPATIENT
Start: 2023-04-23 | End: 2023-04-24

## 2023-04-23 RX ORDER — SODIUM CHLORIDE, SODIUM LACTATE, POTASSIUM CHLORIDE, CALCIUM CHLORIDE 600; 310; 30; 20 MG/100ML; MG/100ML; MG/100ML; MG/100ML
INJECTION, SOLUTION INTRAVENOUS CONTINUOUS
Status: DISCONTINUED | OUTPATIENT
Start: 2023-04-23 | End: 2023-04-23 | Stop reason: HOSPADM

## 2023-04-23 RX ORDER — HYDROMORPHONE HYDROCHLORIDE 1 MG/ML
0.4 INJECTION, SOLUTION INTRAMUSCULAR; INTRAVENOUS; SUBCUTANEOUS EVERY 5 MIN PRN
Status: DISCONTINUED | OUTPATIENT
Start: 2023-04-23 | End: 2023-04-23 | Stop reason: HOSPADM

## 2023-04-23 RX ORDER — NEOSTIGMINE METHYLSULFATE 1 MG/ML
INJECTION, SOLUTION INTRAVENOUS AS NEEDED
Status: DISCONTINUED | OUTPATIENT
Start: 2023-04-23 | End: 2023-04-23 | Stop reason: SURG

## 2023-04-23 RX ORDER — METOCLOPRAMIDE HYDROCHLORIDE 5 MG/ML
10 INJECTION INTRAMUSCULAR; INTRAVENOUS EVERY 8 HOURS PRN
Status: DISCONTINUED | OUTPATIENT
Start: 2023-04-23 | End: 2023-04-24

## 2023-04-23 RX ORDER — HYDROMORPHONE HYDROCHLORIDE 1 MG/ML
0.2 INJECTION, SOLUTION INTRAMUSCULAR; INTRAVENOUS; SUBCUTANEOUS EVERY 2 HOUR PRN
Status: DISCONTINUED | OUTPATIENT
Start: 2023-04-23 | End: 2023-04-24

## 2023-04-23 RX ORDER — POLYETHYLENE GLYCOL 3350 17 G/17G
17 POWDER, FOR SOLUTION ORAL DAILY PRN
Status: DISCONTINUED | OUTPATIENT
Start: 2023-04-23 | End: 2023-04-24

## 2023-04-23 RX ORDER — ACETAMINOPHEN 500 MG
1000 TABLET ORAL ONCE AS NEEDED
Status: DISCONTINUED | OUTPATIENT
Start: 2023-04-23 | End: 2023-04-23 | Stop reason: HOSPADM

## 2023-04-23 RX ORDER — SODIUM PHOSPHATE, DIBASIC AND SODIUM PHOSPHATE, MONOBASIC 7; 19 G/133ML; G/133ML
1 ENEMA RECTAL ONCE AS NEEDED
Status: DISCONTINUED | OUTPATIENT
Start: 2023-04-23 | End: 2023-04-24

## 2023-04-23 RX ORDER — METOCLOPRAMIDE HYDROCHLORIDE 5 MG/ML
10 INJECTION INTRAMUSCULAR; INTRAVENOUS EVERY 8 HOURS PRN
Status: DISCONTINUED | OUTPATIENT
Start: 2023-04-23 | End: 2023-04-23 | Stop reason: HOSPADM

## 2023-04-23 RX ORDER — TRAMADOL HYDROCHLORIDE 50 MG/1
50 TABLET ORAL EVERY 6 HOURS PRN
Qty: 40 TABLET | Refills: 0 | Status: SHIPPED | OUTPATIENT
Start: 2023-04-23

## 2023-04-23 RX ORDER — SODIUM CHLORIDE, SODIUM LACTATE, POTASSIUM CHLORIDE, CALCIUM CHLORIDE 600; 310; 30; 20 MG/100ML; MG/100ML; MG/100ML; MG/100ML
INJECTION, SOLUTION INTRAVENOUS CONTINUOUS PRN
Status: DISCONTINUED | OUTPATIENT
Start: 2023-04-23 | End: 2023-04-23 | Stop reason: SURG

## 2023-04-23 RX ORDER — CEFAZOLIN SODIUM/WATER 2 G/20 ML
2 SYRINGE (ML) INTRAVENOUS EVERY 8 HOURS
Status: COMPLETED | OUTPATIENT
Start: 2023-04-23 | End: 2023-04-24

## 2023-04-23 RX ORDER — ONDANSETRON 2 MG/ML
4 INJECTION INTRAMUSCULAR; INTRAVENOUS EVERY 6 HOURS PRN
Status: DISCONTINUED | OUTPATIENT
Start: 2023-04-23 | End: 2023-04-24

## 2023-04-23 RX ORDER — ONDANSETRON 2 MG/ML
4 INJECTION INTRAMUSCULAR; INTRAVENOUS EVERY 6 HOURS PRN
Status: DISCONTINUED | OUTPATIENT
Start: 2023-04-23 | End: 2023-04-23 | Stop reason: HOSPADM

## 2023-04-23 RX ORDER — GLYCOPYRROLATE 0.2 MG/ML
INJECTION, SOLUTION INTRAMUSCULAR; INTRAVENOUS AS NEEDED
Status: DISCONTINUED | OUTPATIENT
Start: 2023-04-23 | End: 2023-04-23 | Stop reason: SURG

## 2023-04-23 RX ORDER — MEPERIDINE HYDROCHLORIDE 25 MG/ML
12.5 INJECTION INTRAMUSCULAR; INTRAVENOUS; SUBCUTANEOUS AS NEEDED
Status: DISCONTINUED | OUTPATIENT
Start: 2023-04-23 | End: 2023-04-23 | Stop reason: HOSPADM

## 2023-04-23 RX ORDER — PHENYLEPHRINE HCL 10 MG/ML
VIAL (ML) INJECTION AS NEEDED
Status: DISCONTINUED | OUTPATIENT
Start: 2023-04-23 | End: 2023-04-23 | Stop reason: SURG

## 2023-04-23 RX ORDER — HYDROCODONE BITARTRATE AND ACETAMINOPHEN 5; 325 MG/1; MG/1
2 TABLET ORAL ONCE AS NEEDED
Status: DISCONTINUED | OUTPATIENT
Start: 2023-04-23 | End: 2023-04-23 | Stop reason: HOSPADM

## 2023-04-23 RX ORDER — TRANEXAMIC ACID 10 MG/ML
INJECTION, SOLUTION INTRAVENOUS AS NEEDED
Status: DISCONTINUED | OUTPATIENT
Start: 2023-04-23 | End: 2023-04-23 | Stop reason: SURG

## 2023-04-23 RX ORDER — SENNOSIDES 8.6 MG
17.2 TABLET ORAL NIGHTLY
Status: DISCONTINUED | OUTPATIENT
Start: 2023-04-23 | End: 2023-04-24

## 2023-04-23 RX ORDER — HYDROCODONE BITARTRATE AND ACETAMINOPHEN 5; 325 MG/1; MG/1
1 TABLET ORAL ONCE AS NEEDED
Status: DISCONTINUED | OUTPATIENT
Start: 2023-04-23 | End: 2023-04-23 | Stop reason: HOSPADM

## 2023-04-23 RX ORDER — ROCURONIUM BROMIDE 10 MG/ML
INJECTION, SOLUTION INTRAVENOUS AS NEEDED
Status: DISCONTINUED | OUTPATIENT
Start: 2023-04-23 | End: 2023-04-23 | Stop reason: SURG

## 2023-04-23 RX ORDER — HYDROMORPHONE HYDROCHLORIDE 1 MG/ML
INJECTION, SOLUTION INTRAMUSCULAR; INTRAVENOUS; SUBCUTANEOUS
Status: COMPLETED
Start: 2023-04-23 | End: 2023-04-23

## 2023-04-23 RX ORDER — TRANEXAMIC ACID 10 MG/ML
INJECTION, SOLUTION INTRAVENOUS CONTINUOUS PRN
Status: COMPLETED | OUTPATIENT
Start: 2023-04-23 | End: 2023-04-23

## 2023-04-23 RX ORDER — DEXAMETHASONE SODIUM PHOSPHATE 4 MG/ML
VIAL (ML) INJECTION AS NEEDED
Status: DISCONTINUED | OUTPATIENT
Start: 2023-04-23 | End: 2023-04-23 | Stop reason: SURG

## 2023-04-23 RX ORDER — DOXEPIN HYDROCHLORIDE 50 MG/1
1 CAPSULE ORAL DAILY
Status: DISCONTINUED | OUTPATIENT
Start: 2023-04-24 | End: 2023-04-24

## 2023-04-23 RX ORDER — DOCUSATE SODIUM 100 MG/1
100 CAPSULE, LIQUID FILLED ORAL 2 TIMES DAILY
Status: DISCONTINUED | OUTPATIENT
Start: 2023-04-23 | End: 2023-04-24

## 2023-04-23 RX ORDER — OXYCODONE HYDROCHLORIDE 5 MG/1
5 TABLET ORAL EVERY 6 HOURS PRN
Qty: 20 TABLET | Refills: 0 | Status: SHIPPED | OUTPATIENT
Start: 2023-04-23

## 2023-04-23 RX ORDER — BISACODYL 10 MG
10 SUPPOSITORY, RECTAL RECTAL
Status: DISCONTINUED | OUTPATIENT
Start: 2023-04-23 | End: 2023-04-24

## 2023-04-23 RX ORDER — ESMOLOL HYDROCHLORIDE 10 MG/ML
INJECTION INTRAVENOUS AS NEEDED
Status: DISCONTINUED | OUTPATIENT
Start: 2023-04-23 | End: 2023-04-23 | Stop reason: SURG

## 2023-04-23 RX ORDER — HYDROMORPHONE HYDROCHLORIDE 1 MG/ML
0.2 INJECTION, SOLUTION INTRAMUSCULAR; INTRAVENOUS; SUBCUTANEOUS EVERY 5 MIN PRN
Status: DISCONTINUED | OUTPATIENT
Start: 2023-04-23 | End: 2023-04-23 | Stop reason: HOSPADM

## 2023-04-23 RX ORDER — HYDROMORPHONE HYDROCHLORIDE 1 MG/ML
0.6 INJECTION, SOLUTION INTRAMUSCULAR; INTRAVENOUS; SUBCUTANEOUS EVERY 5 MIN PRN
Status: DISCONTINUED | OUTPATIENT
Start: 2023-04-23 | End: 2023-04-23 | Stop reason: HOSPADM

## 2023-04-23 RX ORDER — NALOXONE HYDROCHLORIDE 0.4 MG/ML
80 INJECTION, SOLUTION INTRAMUSCULAR; INTRAVENOUS; SUBCUTANEOUS AS NEEDED
Status: DISCONTINUED | OUTPATIENT
Start: 2023-04-23 | End: 2023-04-23 | Stop reason: HOSPADM

## 2023-04-23 RX ORDER — OXYCODONE HYDROCHLORIDE 5 MG/1
5 TABLET ORAL EVERY 4 HOURS PRN
Status: DISCONTINUED | OUTPATIENT
Start: 2023-04-23 | End: 2023-04-24

## 2023-04-23 RX ORDER — MIDAZOLAM HYDROCHLORIDE 1 MG/ML
1 INJECTION INTRAMUSCULAR; INTRAVENOUS EVERY 5 MIN PRN
Status: DISCONTINUED | OUTPATIENT
Start: 2023-04-23 | End: 2023-04-23 | Stop reason: HOSPADM

## 2023-04-23 RX ORDER — METOCLOPRAMIDE HYDROCHLORIDE 5 MG/ML
INJECTION INTRAMUSCULAR; INTRAVENOUS AS NEEDED
Status: DISCONTINUED | OUTPATIENT
Start: 2023-04-23 | End: 2023-04-23 | Stop reason: SURG

## 2023-04-23 RX ORDER — LABETALOL HYDROCHLORIDE 5 MG/ML
5 INJECTION, SOLUTION INTRAVENOUS EVERY 5 MIN PRN
Status: DISCONTINUED | OUTPATIENT
Start: 2023-04-23 | End: 2023-04-23 | Stop reason: HOSPADM

## 2023-04-23 RX ADMIN — SODIUM CHLORIDE, SODIUM LACTATE, POTASSIUM CHLORIDE, CALCIUM CHLORIDE: 600; 310; 30; 20 INJECTION, SOLUTION INTRAVENOUS at 07:34:00

## 2023-04-23 RX ADMIN — ONDANSETRON 4 MG: 2 INJECTION INTRAMUSCULAR; INTRAVENOUS at 09:22:00

## 2023-04-23 RX ADMIN — PHENYLEPHRINE HCL 100 MCG: 10 MG/ML VIAL (ML) INJECTION at 08:31:00

## 2023-04-23 RX ADMIN — DEXAMETHASONE SODIUM PHOSPHATE 4 MG: 4 MG/ML VIAL (ML) INJECTION at 07:50:00

## 2023-04-23 RX ADMIN — ESMOLOL HYDROCHLORIDE 40 MG: 10 INJECTION INTRAVENOUS at 08:20:00

## 2023-04-23 RX ADMIN — NEOSTIGMINE METHYLSULFATE 4 MG: 1 INJECTION, SOLUTION INTRAVENOUS at 09:22:00

## 2023-04-23 RX ADMIN — METOCLOPRAMIDE HYDROCHLORIDE 10 MG: 5 INJECTION INTRAMUSCULAR; INTRAVENOUS at 07:50:00

## 2023-04-23 RX ADMIN — GLYCOPYRROLATE 0.6 MG: 0.2 INJECTION, SOLUTION INTRAMUSCULAR; INTRAVENOUS at 09:22:00

## 2023-04-23 RX ADMIN — TRANEXAMIC ACID 1000 MG: 10 INJECTION, SOLUTION INTRAVENOUS at 07:50:00

## 2023-04-23 RX ADMIN — ROCURONIUM BROMIDE 50 MG: 10 INJECTION, SOLUTION INTRAVENOUS at 07:40:00

## 2023-04-23 RX ADMIN — CEFAZOLIN SODIUM/WATER 2 G: 2 G/20 ML SYRINGE (ML) INTRAVENOUS at 07:50:00

## 2023-04-23 NOTE — ANESTHESIA PROCEDURE NOTES
Regional Block    Date/Time: 4/23/2023 9:29 AM    Performed by: Rizwana Jasso MD  Authorized by: Rizwana Jasso MD      General Information and Staff    Start Time:  4/23/2023 9:29 AM  End Time:  4/23/2023 9:36 AM  Anesthesiologist:  Rizwana Jasso MD  Performed by: Anesthesiologist  Patient Location:  OR    Block Placement: Post Induction  Site Identification: real time ultrasound guided and image stored and retrievable    Block site/laterality marked before start: site marked  Reason for Block: at surgeon's request and post-op pain management    Preanesthetic Checklist: 2 patient identifers, IV checked, risks and benefits discussed, monitors and equipment checked, pre-op evaluation, timeout performed, anesthesia consent, sterile technique used, no prohibitive neurological deficits and no local skin infection at insertion site      Procedure Details    Patient Position:   Prep: ChloraPrep    Monitoring:  Cardiac monitor, continuous pulse ox and blood pressure cuff  Block Type:  Fascia iliaca  Laterality:  Left  Injection Technique:  Single-shot    Needle    Needle Type:  Short-bevel and echogenic  Needle Localization:  Ultrasound guidance  Reason for Ultrasound Use: appropriate spread of the medication was noted in real time and no ultrasound evidence of intravascular and/or intraneural injection            Assessment    Injection Assessment:  Good spread noted, negative resistance, negative aspiration for heme, incremental injection and low pressure  Heart Rate Change: No    - Patient tolerated block procedure well without evidence of immediate block related complications.      Medications  4/23/2023 9:29 AM      Additional Comments    20 mL 0.5% ropivacaine w/ 2mg PF dexamethasone

## 2023-04-23 NOTE — PLAN OF CARE
A&Ox4 Vitals signs stable, 1L O2 as needed. POD #0 Aquacel dressing x2 in place over L Hip incision C/D/I. PT/OT to see, Telemetry monitoring in place, SCDs, Tolerating diet, Call light within reach. Plan of care discussed, patient verbalized understanding.

## 2023-04-23 NOTE — ANESTHESIA PROCEDURE NOTES
Airway  Date/Time: 4/23/2023 7:42 AM  Urgency: elective      General Information and Staff    Patient location during procedure: OR  Anesthesiologist: Tristin Mcdonald MD  Performed: anesthesiologist   Performed by: Tristin Mcdonald MD  Authorized by: Tristin Mcdonald MD      Indications and Patient Condition  Indications for airway management: anesthesia  Sedation level: deep  Preoxygenated: yes  Patient position: sniffing  Mask difficulty assessment: 0 - not attempted    Final Airway Details  Final airway type: endotracheal airway      Successful airway: ETT  Cuffed: yes   Successful intubation technique: direct laryngoscopy  Endotracheal tube insertion site: oral  Blade: Ros  Blade size: #3  ETT size (mm): 7.0    Cormack-Lehane Classification: grade I - full view of glottis  Placement verified by: chest auscultation and capnometry   Measured from: lips  ETT to lips (cm): 21  Number of attempts at approach: 1

## 2023-04-23 NOTE — OPERATIVE REPORT
[facility]  Report of [address] Patient Status:  Inpatient    1940 MRN [id number]   [facility] SURGERY Attending Amber Parrish MD   Hosp Day # 2 PCP No primary care provider on file. Preoperative diagnosis: #1 displaced left femoral neck fracture #2 failure of fixation left femoral neck fracture repair  Postoperative diagnosis: Same  Procedure: #1 removal of 3 cannulated screws left hip #2 cemented left hip hemiarthroplasty  Surgeon: Staci Rosen MD  First assistant TATIANNA Justice  Anesthesia: General   ml  Complication: Nondisplaced left greater trochanteric fracture  Implant: DePuy size #3 standard offset C stem, 46 mm bipolar femoral shell, 28 x 1.5 femoral head    Operative detail:  Patient was brought to the OR. Was laid in supine position. Anesthesia was done. Left leg was approximately 1.5 cm shorter. Patient was then turned onto her left side up. Pegboard was used. Axillary padding was done. Arms were positioned by anesthesia. Nonoperative leg was padded well with placement of SCD. Left hip was prepped and draped in sterile fashion. Ancef and TXA were given. Timeout was performed. Incorporating the prior incision from percutaneous hip screws, I extended this incision inferiorly and superiorly angulating slightly posteriorly. Sharp dissection was made through adipose subcu tissue. Fascial layer was identified and sharply divided. 3 screw heads were easily identified. These were taken out atraumatically. Anterior 30% of gluteus medius was taken down in continuity with the vastus lateralis. Capsulotomy was made. Hip joint was exposed. Femoral neck fracture was easily identified. Femoral neck cut was made as planned. Acetabulum was exposed using cerebellar retractors carefully. Femoral head was removed. Femoral head was sized 46. Acetabulum was inspected with no traumatic lesions or any severe DJD.   Proximal femur was then carefully exposed with the empty 3 screw holes along the subtrochanteric area in mind. Box osteotome was used. Then a straight canal finder was to establish femoral canal alignment. Sequential broaching up to a size 3 was made. Size 3 was felt to be stable. Hip was reduced with a 1.5 neck length 46 bipolar shell. Leg length appeared to be equalized. Soft tissue tension appeared to be satisfactory. Hip was ranged and was very stable to full extension and external rotation to 90. It was also stable to flexion to 90 and abduction and internal rotation to approximately 45 degrees. Hip was carefully dislocated. Trial components were removed. Distal femoral canal cement plug was then inserted. Femoral canal was then irrigated using a brush . At this stage I found a nondisplaced fracture of the greater trochanteric fragment through one of the screw holes extending superiorly and vertically bisecting the greater trochanteric bone. It was nondisplaced with fairly good amount of soft tissue sleeve. I rongeured out some bone from the femoral head and filled in the empty screws holes from inside the femoral canal.  Femoral canal was suctioned dried packing was inserted into the femoral canal.  Grafighters high viscosity cement was then mixed in the cement gun. Packing was removed and canal was suctioned again. Cement was then introduced in a retrograde fashion using the cement gun. Pressurization of the cement was made. Size number 3C stem was then carefully inserted in neutral position. After the cemented hardened, I retrialed the femoral head and everything was found to be stable. The greater trochanteric fragment also appeared to be stable. Hip was carefully dislocated and the real femoral head with bipolar shell was impacted. Hip was reduced. It was again ranged with findings of good stability. Although I had the trochanteric already but ended up not using it. Hip wound was irrigated copiously. This irrigation was also done with Irrisept. Gluteus medius was then reattached back to greater trochanteric bone. Vastus lateralis layer was also closed. Fascial layer was closed in interrupted fashion. Multiple layers were inserted into the thick subcutaneous adipose layer. Skin was closed using staples. Sterile dressing was applied. All counts were correct.   Amalia Avitia MD

## 2023-04-23 NOTE — PLAN OF CARE
Alert and oriented x4. VSS. On RA. . Telemetry monitoring. SCDs on BLE. Tolerating diet. Pain controlled with PRN medication. Steri strips to left hip C/D/I. Voiding via purewick. Plan is NPO at midnight and OR scheduled for 4/23 at 0710. Call light within reach.

## 2023-04-24 VITALS
WEIGHT: 164 LBS | SYSTOLIC BLOOD PRESSURE: 139 MMHG | HEIGHT: 64 IN | DIASTOLIC BLOOD PRESSURE: 52 MMHG | HEART RATE: 63 BPM | RESPIRATION RATE: 21 BRPM | OXYGEN SATURATION: 93 % | TEMPERATURE: 98 F | BODY MASS INDEX: 28 KG/M2

## 2023-04-24 LAB
ATRIAL RATE: 67 BPM
GLUCOSE BLD-MCNC: 134 MG/DL (ref 70–99)
GLUCOSE BLD-MCNC: 139 MG/DL (ref 70–99)
GLUCOSE BLD-MCNC: 199 MG/DL (ref 70–99)
HCT VFR BLD AUTO: 35.5 %
HGB BLD-MCNC: 11.4 G/DL
P AXIS: 62 DEGREES
P-R INTERVAL: 174 MS
Q-T INTERVAL: 394 MS
QRS DURATION: 86 MS
QTC CALCULATION (BEZET): 416 MS
R AXIS: -1 DEGREES
T AXIS: 62 DEGREES
VENTRICULAR RATE: 67 BPM

## 2023-04-24 PROCEDURE — 99232 SBSQ HOSP IP/OBS MODERATE 35: CPT | Performed by: INTERNAL MEDICINE

## 2023-04-24 RX ORDER — RAMIPRIL 2.5 MG/1
2.5 CAPSULE ORAL DAILY
Status: SHIPPED | COMMUNITY
Start: 2023-04-24

## 2023-04-24 NOTE — CM/SW NOTE
Spoke with pt's RN who anticipates DC to [facility] later today. [facility] and received confirmation that pt can be accepted for readmission today. [facility] transport scheduled for 630pm today. PCS form completed and available for RN to print. Updated pt at bedside and informed her of costs for medicar transport. Pt agreeable with plan and costs for medicar. Updated RN. No further needs at this time. / to remain available for support and/or discharge planning.      The [address] [place] [address] at [address] [facility]  [address], [facility]  Discharge Planner  [phone]

## 2023-04-24 NOTE — PLAN OF CARE
A&Ox4. VSS. On room air. . IS encouraged. Telemetry monitoring. SCDs on BLE. Ankle pumps encouraged. Tolerating diet. Last BM 4/21 - constipation education provided. Voiding via purewick. Pain controlled with PO medication. Aquacel x2 to left hip C/D/I. 50% WB to LLE. Plan is for PT/OT eval. Awaiting wound care consult. Patient updated on plan of care. Safety precautions in place. Call light within reach. Will continue to monitor.

## 2023-04-24 NOTE — PROGRESS NOTES
AVS completed & printed out, will dc to the [address] [place] [address] at 630 pm, scripts in dc packet, IV to be dc'd per [employer].

## 2023-04-24 NOTE — PLAN OF CARE
Patient A/O x4, VSS on RA-2L PRN, c/o moderate pain. Norco PRN. , tele, SCDs. Voiding freely via purewick, last BM 4/21. 50% WB to LLE. Plan for PT/OT eval and wound care consult tomorrow. Safety measures in place.

## 2023-04-25 ENCOUNTER — EXTERNAL FACILITY (OUTPATIENT)
Dept: FAMILY MEDICINE CLINIC | Facility: CLINIC | Age: 83
End: 2023-04-25

## 2023-04-25 DIAGNOSIS — Z96.649 S/P HIP HEMIARTHROPLASTY: ICD-10-CM

## 2023-04-25 DIAGNOSIS — I10 ESSENTIAL HYPERTENSION: ICD-10-CM

## 2023-04-25 DIAGNOSIS — E11.65 TYPE 2 DIABETES MELLITUS WITH HYPERGLYCEMIA, WITHOUT LONG-TERM CURRENT USE OF INSULIN (HCC): ICD-10-CM

## 2023-04-25 DIAGNOSIS — Z86.79 H/O SICK SINUS SYNDROME: ICD-10-CM

## 2023-04-25 DIAGNOSIS — Z47.89 ORTHOPEDIC AFTERCARE: Primary | ICD-10-CM

## 2023-04-25 DIAGNOSIS — S72.002A CLOSED FRACTURE OF NECK OF LEFT FEMUR, INITIAL ENCOUNTER (HCC): ICD-10-CM

## 2023-04-25 PROCEDURE — 99305 1ST NF CARE MODERATE MDM 35: CPT | Performed by: FAMILY MEDICINE

## 2023-04-25 NOTE — PROGRESS NOTES
Patient cleared by all MD's for discharge. IV removed. Scripts, paperwork, and belongings sent with transport. Patient discharged to the [facility].

## 2023-04-26 ENCOUNTER — INITIAL APN SNF VISIT (OUTPATIENT)
Dept: INTERNAL MEDICINE CLINIC | Age: 83
End: 2023-04-26

## 2023-04-26 VITALS
BODY MASS INDEX: 28 KG/M2 | OXYGEN SATURATION: 95 % | WEIGHT: 164.5 LBS | TEMPERATURE: 98 F | DIASTOLIC BLOOD PRESSURE: 52 MMHG | HEART RATE: 83 BPM | RESPIRATION RATE: 18 BRPM | SYSTOLIC BLOOD PRESSURE: 129 MMHG

## 2023-04-26 DIAGNOSIS — R53.1 WEAKNESS: ICD-10-CM

## 2023-04-26 DIAGNOSIS — L02.211 CUTANEOUS ABSCESS OF ABDOMINAL WALL: ICD-10-CM

## 2023-04-26 DIAGNOSIS — I49.5 SSS (SICK SINUS SYNDROME) (HCC): ICD-10-CM

## 2023-04-26 DIAGNOSIS — K21.9 GASTROESOPHAGEAL REFLUX DISEASE, UNSPECIFIED WHETHER ESOPHAGITIS PRESENT: ICD-10-CM

## 2023-04-26 DIAGNOSIS — F32.A DEPRESSION, UNSPECIFIED DEPRESSION TYPE: Chronic | ICD-10-CM

## 2023-04-26 DIAGNOSIS — I10 PRIMARY HYPERTENSION: Primary | ICD-10-CM

## 2023-04-26 DIAGNOSIS — S72.002S CLOSED FRACTURE OF LEFT HIP REQUIRING OPERATIVE REPAIR, SEQUELA: ICD-10-CM

## 2023-04-26 DIAGNOSIS — Z78.9 DECREASED ACTIVITIES OF DAILY LIVING (ADL): ICD-10-CM

## 2023-04-26 DIAGNOSIS — E11.9 CONTROLLED TYPE 2 DIABETES MELLITUS WITHOUT COMPLICATION, WITHOUT LONG-TERM CURRENT USE OF INSULIN (HCC): ICD-10-CM

## 2023-04-26 PROCEDURE — 99310 SBSQ NF CARE HIGH MDM 45: CPT | Performed by: NURSE PRACTITIONER

## 2023-04-26 PROCEDURE — 1124F ACP DISCUSS-NO DSCNMKR DOCD: CPT | Performed by: NURSE PRACTITIONER

## 2023-04-28 ENCOUNTER — SNF VISIT (OUTPATIENT)
Dept: INTERNAL MEDICINE CLINIC | Age: 83
End: 2023-04-28

## 2023-04-28 VITALS
OXYGEN SATURATION: 93 % | SYSTOLIC BLOOD PRESSURE: 135 MMHG | RESPIRATION RATE: 18 BRPM | BODY MASS INDEX: 28 KG/M2 | WEIGHT: 163.81 LBS | HEART RATE: 77 BPM | DIASTOLIC BLOOD PRESSURE: 54 MMHG | TEMPERATURE: 98 F

## 2023-04-28 DIAGNOSIS — S72.002S CLOSED FRACTURE OF LEFT HIP REQUIRING OPERATIVE REPAIR, SEQUELA: ICD-10-CM

## 2023-04-28 DIAGNOSIS — K59.00 CONSTIPATION, UNSPECIFIED CONSTIPATION TYPE: ICD-10-CM

## 2023-04-28 DIAGNOSIS — E11.9 CONTROLLED TYPE 2 DIABETES MELLITUS WITHOUT COMPLICATION, WITHOUT LONG-TERM CURRENT USE OF INSULIN (HCC): ICD-10-CM

## 2023-04-28 DIAGNOSIS — Z78.9 DECREASED ACTIVITIES OF DAILY LIVING (ADL): ICD-10-CM

## 2023-04-28 DIAGNOSIS — I10 PRIMARY HYPERTENSION: Primary | ICD-10-CM

## 2023-04-28 DIAGNOSIS — R53.1 WEAKNESS: ICD-10-CM

## 2023-04-28 PROCEDURE — 99308 SBSQ NF CARE LOW MDM 20: CPT | Performed by: NURSE PRACTITIONER

## 2023-05-01 ENCOUNTER — SNF VISIT (OUTPATIENT)
Dept: INTERNAL MEDICINE CLINIC | Age: 83
End: 2023-05-01

## 2023-05-01 VITALS
RESPIRATION RATE: 18 BRPM | BODY MASS INDEX: 28 KG/M2 | HEART RATE: 81 BPM | SYSTOLIC BLOOD PRESSURE: 148 MMHG | DIASTOLIC BLOOD PRESSURE: 63 MMHG | WEIGHT: 163.81 LBS | TEMPERATURE: 97 F | OXYGEN SATURATION: 96 %

## 2023-05-01 DIAGNOSIS — K21.9 GASTROESOPHAGEAL REFLUX DISEASE, UNSPECIFIED WHETHER ESOPHAGITIS PRESENT: ICD-10-CM

## 2023-05-01 DIAGNOSIS — Z78.9 DECREASED ACTIVITIES OF DAILY LIVING (ADL): ICD-10-CM

## 2023-05-01 DIAGNOSIS — E11.9 CONTROLLED TYPE 2 DIABETES MELLITUS WITHOUT COMPLICATION, WITHOUT LONG-TERM CURRENT USE OF INSULIN (HCC): ICD-10-CM

## 2023-05-01 DIAGNOSIS — M79.662 PAIN OF LEFT CALF: Primary | ICD-10-CM

## 2023-05-01 DIAGNOSIS — I10 PRIMARY HYPERTENSION: ICD-10-CM

## 2023-05-01 DIAGNOSIS — L02.211 CUTANEOUS ABSCESS OF ABDOMINAL WALL: ICD-10-CM

## 2023-05-01 DIAGNOSIS — R53.1 WEAKNESS: ICD-10-CM

## 2023-05-01 DIAGNOSIS — S72.002S CLOSED FRACTURE OF LEFT HIP REQUIRING OPERATIVE REPAIR, SEQUELA: ICD-10-CM

## 2023-05-03 ENCOUNTER — SNF VISIT (OUTPATIENT)
Dept: INTERNAL MEDICINE CLINIC | Age: 83
End: 2023-05-03

## 2023-05-03 VITALS
DIASTOLIC BLOOD PRESSURE: 60 MMHG | HEART RATE: 87 BPM | BODY MASS INDEX: 29 KG/M2 | SYSTOLIC BLOOD PRESSURE: 123 MMHG | OXYGEN SATURATION: 94 % | WEIGHT: 166.63 LBS | RESPIRATION RATE: 18 BRPM | TEMPERATURE: 98 F

## 2023-05-03 DIAGNOSIS — S72.002S CLOSED FRACTURE OF LEFT HIP REQUIRING OPERATIVE REPAIR, SEQUELA: ICD-10-CM

## 2023-05-03 DIAGNOSIS — M79.662 PAIN OF LEFT CALF: Primary | ICD-10-CM

## 2023-05-03 DIAGNOSIS — I10 PRIMARY HYPERTENSION: ICD-10-CM

## 2023-05-03 DIAGNOSIS — E11.9 CONTROLLED TYPE 2 DIABETES MELLITUS WITHOUT COMPLICATION, WITHOUT LONG-TERM CURRENT USE OF INSULIN (HCC): ICD-10-CM

## 2023-05-03 DIAGNOSIS — Z78.9 DECREASED ACTIVITIES OF DAILY LIVING (ADL): ICD-10-CM

## 2023-05-03 DIAGNOSIS — R53.1 WEAKNESS: ICD-10-CM

## 2023-05-03 PROCEDURE — 99308 SBSQ NF CARE LOW MDM 20: CPT | Performed by: NURSE PRACTITIONER

## 2023-05-10 ENCOUNTER — SNF VISIT (OUTPATIENT)
Dept: INTERNAL MEDICINE CLINIC | Age: 83
End: 2023-05-10

## 2023-05-10 VITALS
DIASTOLIC BLOOD PRESSURE: 58 MMHG | TEMPERATURE: 98 F | RESPIRATION RATE: 17 BRPM | WEIGHT: 162.81 LBS | OXYGEN SATURATION: 98 % | BODY MASS INDEX: 28 KG/M2 | HEART RATE: 72 BPM | SYSTOLIC BLOOD PRESSURE: 115 MMHG

## 2023-05-10 DIAGNOSIS — R35.0 URINARY FREQUENCY: ICD-10-CM

## 2023-05-10 DIAGNOSIS — R53.1 WEAKNESS: ICD-10-CM

## 2023-05-10 DIAGNOSIS — R39.15 URINARY URGENCY: ICD-10-CM

## 2023-05-10 DIAGNOSIS — M79.662 PAIN OF LEFT CALF: ICD-10-CM

## 2023-05-10 DIAGNOSIS — I10 PRIMARY HYPERTENSION: ICD-10-CM

## 2023-05-10 DIAGNOSIS — K21.9 GASTROESOPHAGEAL REFLUX DISEASE, UNSPECIFIED WHETHER ESOPHAGITIS PRESENT: ICD-10-CM

## 2023-05-10 DIAGNOSIS — S72.002S CLOSED FRACTURE OF LEFT HIP REQUIRING OPERATIVE REPAIR, SEQUELA: Primary | ICD-10-CM

## 2023-05-10 DIAGNOSIS — F32.A DEPRESSION, UNSPECIFIED DEPRESSION TYPE: ICD-10-CM

## 2023-05-10 DIAGNOSIS — E11.9 CONTROLLED TYPE 2 DIABETES MELLITUS WITHOUT COMPLICATION, WITHOUT LONG-TERM CURRENT USE OF INSULIN (HCC): ICD-10-CM

## 2023-05-10 PROCEDURE — 99309 SBSQ NF CARE MODERATE MDM 30: CPT | Performed by: NURSE PRACTITIONER

## 2023-05-15 ENCOUNTER — SNF DISCHARGE (OUTPATIENT)
Dept: INTERNAL MEDICINE CLINIC | Age: 83
End: 2023-05-15

## 2023-05-15 VITALS
HEART RATE: 68 BPM | BODY MASS INDEX: 28 KG/M2 | DIASTOLIC BLOOD PRESSURE: 53 MMHG | TEMPERATURE: 98 F | SYSTOLIC BLOOD PRESSURE: 133 MMHG | WEIGHT: 162 LBS | OXYGEN SATURATION: 93 % | RESPIRATION RATE: 18 BRPM

## 2023-05-15 DIAGNOSIS — E11.9 CONTROLLED TYPE 2 DIABETES MELLITUS WITHOUT COMPLICATION, WITHOUT LONG-TERM CURRENT USE OF INSULIN (HCC): ICD-10-CM

## 2023-05-15 DIAGNOSIS — Z78.9 DECREASED ACTIVITIES OF DAILY LIVING (ADL): ICD-10-CM

## 2023-05-15 DIAGNOSIS — I10 PRIMARY HYPERTENSION: ICD-10-CM

## 2023-05-15 DIAGNOSIS — L02.211 CUTANEOUS ABSCESS OF ABDOMINAL WALL: Primary | ICD-10-CM

## 2023-05-15 DIAGNOSIS — R53.1 WEAKNESS: ICD-10-CM

## 2023-05-15 DIAGNOSIS — S72.002S CLOSED FRACTURE OF LEFT HIP REQUIRING OPERATIVE REPAIR, SEQUELA: ICD-10-CM

## 2025-01-07 NOTE — OR NURSING
Dr. Samuel Cleary found a pressure sore on patient's right buttock during positioning for her procedure in OR and reported to [facility], for follow up care and treatment. Due for Kinrix & Proquad. Please also offer flu & Covid.  -CFE 1/7/25

## (undated) DEVICE — MEGADYNE ELECTRODE ADULT PT RT

## (undated) DEVICE — 450 ML BOTTLE OF 0.05% CHLORHEXIDINE GLUCONATE IN 99.95% STERILE WATER FOR IRRIGATION, USP AND APPLICATOR.: Brand: IRRISEPT ANTIMICROBIAL WOUND LAVAGE

## (undated) DEVICE — STERILE HOOK LOCK LATEX FREE ELASTIC BANDAGE 6INX5YD: Brand: HOOK LOCK™

## (undated) DEVICE — ATTAHJA VAC WITH 22MM CONNECTR

## (undated) DEVICE — LIGHT HANDLE

## (undated) DEVICE — HIP PINNING CDS: Brand: MEDLINE INDUSTRIES, INC.

## (undated) DEVICE — SUT VICRYL 2-0 CP-1 J266H

## (undated) DEVICE — DRESS WOUND AQUACEL 3.5INX10IN

## (undated) DEVICE — HOOD, PEEL-AWAY: Brand: FLYTE

## (undated) DEVICE — SUT VICRYL 0 CP-2 J870H

## (undated) DEVICE — TOTAL HIP CDS: Brand: MEDLINE INDUSTRIES, INC.

## (undated) DEVICE — STERILE POLYISOPRENE POWDER-FREE SURGICAL GLOVES: Brand: PROTEXIS

## (undated) DEVICE — MLPD DISPOSABLE PAD (6' ROLL) 3 ROLLS: Brand: SCHAERER MEDICAL USA

## (undated) DEVICE — SYRINGE 20CC LL TIP

## (undated) DEVICE — STERILE SYNTHETIC POLYISOPRENE POWDER-FREE SURGICAL GLOVES WITH HYDROGEL COATING, SMOOTH FINISH, STRAIGHT FINGER: Brand: PROTEXIS

## (undated) DEVICE — E-Z BUTTON SWITCH PENCIL

## (undated) DEVICE — PTFE COATED BLADE 4': Brand: MEDLINE

## (undated) DEVICE — SLEEVE KENDALL SCD EXPRESS MED

## (undated) DEVICE — DRESS WOUND AQUACEL 3.5INX6INL

## (undated) DEVICE — SOLUTION  .9 3000ML

## (undated) DEVICE — Device: Brand: STABLECUT®

## (undated) DEVICE — DRAPE,U/SHT,SPLIT,FILM,60X84,STERILE: Brand: MEDLINE

## (undated) DEVICE — BNDG COHESIVE W4INXL5YD TAN E

## (undated) DEVICE — Device: Brand: BOOT LINER, DISPOSABLE

## (undated) DEVICE — ABDUCTION PILLOW SMALL 6X12X18

## (undated) DEVICE — INTENDED TO AID IN THE PASSING OF SUTURES THROUGH BONE AND SOFT TISSUE DURING ORTHOPEDIC SURGERY: Brand: HOFFEE SUTURE RETRIEVER

## (undated) DEVICE — THROAT PACKING X-RAY DETECT

## (undated) DEVICE — SCREW CANN PT 6.5 16X75
Type: IMPLANTABLE DEVICE | Site: HIP | Status: NON-FUNCTIONAL
Removed: 2023-04-07

## (undated) DEVICE — GOWN AERO CHROME XXL

## (undated) DEVICE — HOOD: Brand: FLYTE

## (undated) DEVICE — Device: Brand: POWER-FLO®

## (undated) DEVICE — SUT ETHIBOND 5 CCS MB47G

## (undated) DEVICE — SHEET,DRAPE,70X100,STERILE: Brand: MEDLINE

## (undated) DEVICE — WRAP COOLING HIP W/ICE PILLOW

## (undated) DEVICE — SUT VICRYL 1 OS-6 J535H

## (undated) DEVICE — SOL NACL IRRIG 0.9% 1000ML BTL

## (undated) DEVICE — STERILE POLYISOPRENE POWDER-FREE SURGICAL GLOVES WITH EMOLLIENT COATING: Brand: PROTEXIS